# Patient Record
Sex: FEMALE | Race: WHITE | Employment: OTHER | URBAN - NONMETROPOLITAN AREA
[De-identification: names, ages, dates, MRNs, and addresses within clinical notes are randomized per-mention and may not be internally consistent; named-entity substitution may affect disease eponyms.]

---

## 2018-02-12 ENCOUNTER — APPOINTMENT (OUTPATIENT)
Dept: CT IMAGING | Age: 83
End: 2018-02-12
Payer: MEDICARE

## 2018-02-12 ENCOUNTER — HOSPITAL ENCOUNTER (EMERGENCY)
Age: 83
Discharge: HOME | End: 2018-02-12
Payer: MEDICARE

## 2018-02-12 ENCOUNTER — APPOINTMENT (OUTPATIENT)
Dept: GENERAL RADIOLOGY | Age: 83
End: 2018-02-12
Payer: MEDICARE

## 2018-02-12 VITALS
OXYGEN SATURATION: 96 % | RESPIRATION RATE: 16 BRPM | BODY MASS INDEX: 28.93 KG/M2 | HEART RATE: 70 BPM | WEIGHT: 180 LBS | SYSTOLIC BLOOD PRESSURE: 124 MMHG | DIASTOLIC BLOOD PRESSURE: 51 MMHG | TEMPERATURE: 98.7 F | HEIGHT: 66 IN

## 2018-02-12 DIAGNOSIS — N39.0 ACUTE UTI: Primary | ICD-10-CM

## 2018-02-12 LAB
ALBUMIN SERPL-MCNC: 3.9 G/DL (ref 3.5–5.1)
ALP BLD-CCNC: 62 U/L (ref 38–126)
ALT SERPL-CCNC: 24 U/L (ref 11–66)
ANION GAP SERPL CALCULATED.3IONS-SCNC: 13 MEQ/L (ref 8–16)
AST SERPL-CCNC: 24 U/L (ref 5–40)
BACTERIA: ABNORMAL /HPF
BASOPHILIA: SLIGHT
BASOPHILS # BLD: 0.7 %
BASOPHILS ABSOLUTE: 0.1 THOU/MM3 (ref 0–0.1)
BILIRUB SERPL-MCNC: 0.4 MG/DL (ref 0.3–1.2)
BILIRUBIN DIRECT: < 0.2 MG/DL (ref 0–0.3)
BILIRUBIN URINE: NEGATIVE
BLOOD, URINE: NEGATIVE
BUN BLDV-MCNC: 27 MG/DL (ref 7–22)
CALCIUM SERPL-MCNC: 9.3 MG/DL (ref 8.5–10.5)
CASTS 2: ABNORMAL /LPF
CASTS UA: ABNORMAL /LPF
CHARACTER, URINE: ABNORMAL
CHLORIDE BLD-SCNC: 103 MEQ/L (ref 98–111)
CO2: 27 MEQ/L (ref 23–33)
COLOR: YELLOW
CREAT SERPL-MCNC: 0.9 MG/DL (ref 0.4–1.2)
CRYSTALS, UA: ABNORMAL
EKG ATRIAL RATE: 74 BPM
EKG P AXIS: 44 DEGREES
EKG P-R INTERVAL: 124 MS
EKG Q-T INTERVAL: 432 MS
EKG QRS DURATION: 144 MS
EKG QTC CALCULATION (BAZETT): 479 MS
EKG R AXIS: -51 DEGREES
EKG T AXIS: 11 DEGREES
EKG VENTRICULAR RATE: 74 BPM
EOSINOPHIL # BLD: 1.6 %
EOSINOPHILS ABSOLUTE: 0.3 THOU/MM3 (ref 0–0.4)
EPITHELIAL CELLS, UA: ABNORMAL /HPF
GFR SERPL CREATININE-BSD FRML MDRD: 60 ML/MIN/1.73M2
GLUCOSE BLD-MCNC: 105 MG/DL (ref 70–108)
GLUCOSE URINE: NEGATIVE MG/DL
HCT VFR BLD CALC: 43.8 % (ref 37–47)
HEMOGLOBIN: 14.4 GM/DL (ref 12–16)
KETONES, URINE: NEGATIVE
LEUKOCYTE ESTERASE, URINE: ABNORMAL
LYMPHOCYTES # BLD: 15 %
LYMPHOCYTES ABSOLUTE: 2.4 THOU/MM3 (ref 1–4.8)
MAGNESIUM: 2.3 MG/DL (ref 1.6–2.4)
MCH RBC QN AUTO: 30 PG (ref 27–31)
MCHC RBC AUTO-ENTMCNC: 33 GM/DL (ref 33–37)
MCV RBC AUTO: 91.1 FL (ref 81–99)
MISCELLANEOUS 2: ABNORMAL
MONOCYTES # BLD: 8.4 %
MONOCYTES ABSOLUTE: 1.3 THOU/MM3 (ref 0.4–1.3)
NITRITE, URINE: POSITIVE
NUCLEATED RED BLOOD CELLS: 0 /100 WBC
OSMOLALITY CALCULATION: 290.5 MOSMOL/KG (ref 275–300)
PDW BLD-RTO: 14.4 % (ref 11.5–14.5)
PH UA: 7.5
PLATELET # BLD: 646 THOU/MM3 (ref 130–400)
PLATELET ESTIMATE: ABNORMAL
PMV BLD AUTO: 10.2 FL (ref 7.4–10.4)
POTASSIUM SERPL-SCNC: 4.3 MEQ/L (ref 3.5–5.2)
PROTEIN UA: NEGATIVE
RBC # BLD: 4.81 MILL/MM3 (ref 4.2–5.4)
RBC URINE: ABNORMAL /HPF
RENAL EPITHELIAL, UA: ABNORMAL
SCAN OF BLOOD SMEAR: NORMAL
SEG NEUTROPHILS: 74.3 %
SEGMENTED NEUTROPHILS ABSOLUTE COUNT: 11.8 THOU/MM3 (ref 1.8–7.7)
SODIUM BLD-SCNC: 143 MEQ/L (ref 135–145)
SPECIFIC GRAVITY, URINE: 1.02 (ref 1–1.03)
TOTAL PROTEIN: 6.7 G/DL (ref 6.1–8)
TROPONIN T: < 0.01 NG/ML
UROBILINOGEN, URINE: 0.2 EU/DL
WBC # BLD: 15.9 THOU/MM3 (ref 4.8–10.8)
WBC UA: ABNORMAL /HPF
YEAST: ABNORMAL

## 2018-02-12 PROCEDURE — 36415 COLL VENOUS BLD VENIPUNCTURE: CPT

## 2018-02-12 PROCEDURE — 6370000000 HC RX 637 (ALT 250 FOR IP): Performed by: PHYSICIAN ASSISTANT

## 2018-02-12 PROCEDURE — 71046 X-RAY EXAM CHEST 2 VIEWS: CPT

## 2018-02-12 PROCEDURE — 99284 EMERGENCY DEPT VISIT MOD MDM: CPT

## 2018-02-12 PROCEDURE — 85025 COMPLETE CBC W/AUTO DIFF WBC: CPT

## 2018-02-12 PROCEDURE — 87077 CULTURE AEROBIC IDENTIFY: CPT

## 2018-02-12 PROCEDURE — 87184 SC STD DISK METHOD PER PLATE: CPT

## 2018-02-12 PROCEDURE — 81001 URINALYSIS AUTO W/SCOPE: CPT

## 2018-02-12 PROCEDURE — 84484 ASSAY OF TROPONIN QUANT: CPT

## 2018-02-12 PROCEDURE — 83735 ASSAY OF MAGNESIUM: CPT

## 2018-02-12 PROCEDURE — 73030 X-RAY EXAM OF SHOULDER: CPT

## 2018-02-12 PROCEDURE — 87186 SC STD MICRODIL/AGAR DIL: CPT

## 2018-02-12 PROCEDURE — 87086 URINE CULTURE/COLONY COUNT: CPT

## 2018-02-12 PROCEDURE — 93005 ELECTROCARDIOGRAM TRACING: CPT | Performed by: PHYSICIAN ASSISTANT

## 2018-02-12 PROCEDURE — 80053 COMPREHEN METABOLIC PANEL: CPT

## 2018-02-12 PROCEDURE — 82248 BILIRUBIN DIRECT: CPT

## 2018-02-12 PROCEDURE — 70450 CT HEAD/BRAIN W/O DYE: CPT

## 2018-02-12 RX ORDER — NITROFURANTOIN 25; 75 MG/1; MG/1
100 CAPSULE ORAL 2 TIMES DAILY
Qty: 14 CAPSULE | Refills: 0 | Status: SHIPPED | OUTPATIENT
Start: 2018-02-12 | End: 2018-02-19

## 2018-02-12 RX ORDER — NITROFURANTOIN 25; 75 MG/1; MG/1
100 CAPSULE ORAL ONCE
Status: COMPLETED | OUTPATIENT
Start: 2018-02-12 | End: 2018-02-12

## 2018-02-12 RX ORDER — NITROFURANTOIN 25; 75 MG/1; MG/1
100 CAPSULE ORAL 2 TIMES DAILY
Qty: 14 CAPSULE | Refills: 0 | Status: SHIPPED | OUTPATIENT
Start: 2018-02-12 | End: 2018-02-12 | Stop reason: CLARIF

## 2018-02-12 RX ADMIN — NITROFURANTOIN MONOHYDRATE/MACROCRYSTALLINE 100 MG: 25; 75 CAPSULE ORAL at 21:41

## 2018-02-12 ASSESSMENT — ENCOUNTER SYMPTOMS
BACK PAIN: 0
CHOKING: 0
COUGH: 0
VOMITING: 0
PHOTOPHOBIA: 0
CHEST TIGHTNESS: 0
ABDOMINAL PAIN: 0
NAUSEA: 0
SINUS PAIN: 0
SHORTNESS OF BREATH: 0
WHEEZING: 0
DIARRHEA: 0
SORE THROAT: 0
SINUS PRESSURE: 0
RHINORRHEA: 0
EYE PAIN: 0
COLOR CHANGE: 0

## 2018-02-12 NOTE — ED PROVIDER NOTES
daily., Disp-1 Bottle, R-1      pantoprazole (PROTONIX) 40 MG tablet Take 1 tablet by mouth 2 times daily (before meals). , Disp-30 tablet, R-3      Multiple Vitamins-Minerals (THERAPEUTIC MULTIVITAMIN-MINERALS) tablet Take 1 tablet by mouth daily. ALLERGIES     has No Known Allergies. FAMILY HISTORY     indicated that her mother is . She indicated that her father is . family history includes Stroke in her father. SOCIAL HISTORY      reports that she has quit smoking. She quit after 2.00 years of use. She does not have any smokeless tobacco history on file. She reports that she does not drink alcohol or use drugs. PHYSICAL EXAM     INITIAL VITALS:  height is 5' 6\" (1.676 m) and weight is 180 lb (81.6 kg). Her oral temperature is 98.7 °F (37.1 °C). Her blood pressure is 124/51 (abnormal) and her pulse is 70. Her respiration is 16 and oxygen saturation is 96%. Physical Exam   Constitutional: She is oriented to person, place, and time. She appears well-developed and well-nourished. No distress. HENT:   Head: Normocephalic and atraumatic. Head is without raccoon's eyes, without Garcia's sign, without right periorbital erythema and without left periorbital erythema. Right Ear: External ear normal.   Left Ear: External ear normal.   Nose: Nose normal.   Mouth/Throat: Oropharynx is clear and moist.   Hearing aids noted   Eyes: Conjunctivae and EOM are normal. Pupils are equal, round, and reactive to light. Right eye exhibits no discharge. Left eye exhibits no discharge. Neck: Normal range of motion and full passive range of motion without pain. No spinous process tenderness and no muscular tenderness present. No neck rigidity. Normal range of motion present. Cardiovascular: Normal rate, regular rhythm, normal heart sounds and intact distal pulses. Pulmonary/Chest: Effort normal and breath sounds normal. No stridor. No respiratory distress. She has no wheezes.  She has no dictations but occasionally words are mis-transcribed.)    Tyler Pal, [address] [facility],[address] [place] [address]  02/16/18 [id number]

## 2018-02-13 PROCEDURE — 93010 ELECTROCARDIOGRAM REPORT: CPT | Performed by: INTERNAL MEDICINE

## 2018-02-13 NOTE — ED NOTES
Pt resting in bed with family at bedside, VS reassessed and stable. Cintia Villarreal PA in room to update POC and pt as well as family indicates understanding as well as no further needs, questions, or concerns. Will continue to monitor.       Sharon Aponte RN  02/12/18 2023

## 2018-02-14 LAB
ORGANISM: ABNORMAL
URINE CULTURE REFLEX: ABNORMAL

## 2018-04-23 ENCOUNTER — OFFICE VISIT (OUTPATIENT)
Dept: UROLOGY | Age: 83
End: 2018-04-23
Payer: MEDICARE

## 2018-04-23 VITALS
WEIGHT: 204 LBS | HEIGHT: 66 IN | SYSTOLIC BLOOD PRESSURE: 132 MMHG | BODY MASS INDEX: 32.78 KG/M2 | DIASTOLIC BLOOD PRESSURE: 74 MMHG

## 2018-04-23 DIAGNOSIS — R33.9 INCOMPLETE EMPTYING OF BLADDER: ICD-10-CM

## 2018-04-23 DIAGNOSIS — R31.9 URINARY TRACT INFECTION WITH HEMATURIA, SITE UNSPECIFIED: ICD-10-CM

## 2018-04-23 DIAGNOSIS — R31.29 MICROSCOPIC HEMATURIA: Primary | ICD-10-CM

## 2018-04-23 DIAGNOSIS — N39.0 URINARY TRACT INFECTION WITH HEMATURIA, SITE UNSPECIFIED: ICD-10-CM

## 2018-04-23 LAB
BILIRUBIN URINE: NEGATIVE
BLOOD URINE, POC: ABNORMAL
CHARACTER, URINE: CLEAR
COLOR, URINE: YELLOW
GLUCOSE URINE: NEGATIVE MG/DL
KETONES, URINE: NEGATIVE
LEUKOCYTE CLUMPS, URINE: ABNORMAL
NITRITE, URINE: POSITIVE
PH, URINE: 7
POST VOID RESIDUAL (PVR): 65 ML
PROTEIN, URINE: NEGATIVE MG/DL
SPECIFIC GRAVITY, URINE: 1.02 (ref 1–1.03)
UROBILINOGEN, URINE: 0.2 EU/DL

## 2018-04-23 PROCEDURE — 1036F TOBACCO NON-USER: CPT | Performed by: UROLOGY

## 2018-04-23 PROCEDURE — G8400 PT W/DXA NO RESULTS DOC: HCPCS | Performed by: UROLOGY

## 2018-04-23 PROCEDURE — 99204 OFFICE O/P NEW MOD 45 MIN: CPT | Performed by: UROLOGY

## 2018-04-23 PROCEDURE — 81003 URINALYSIS AUTO W/O SCOPE: CPT | Performed by: UROLOGY

## 2018-04-23 PROCEDURE — 51798 US URINE CAPACITY MEASURE: CPT | Performed by: UROLOGY

## 2018-04-23 PROCEDURE — 1123F ACP DISCUSS/DSCN MKR DOCD: CPT | Performed by: UROLOGY

## 2018-04-23 PROCEDURE — G8427 DOCREV CUR MEDS BY ELIG CLIN: HCPCS | Performed by: UROLOGY

## 2018-04-23 PROCEDURE — 4040F PNEUMOC VAC/ADMIN/RCVD: CPT | Performed by: UROLOGY

## 2018-04-23 PROCEDURE — 52000 CYSTOURETHROSCOPY: CPT | Performed by: UROLOGY

## 2018-04-23 PROCEDURE — G8417 CALC BMI ABV UP PARAM F/U: HCPCS | Performed by: UROLOGY

## 2018-04-23 PROCEDURE — G8599 NO ASA/ANTIPLAT THER USE RNG: HCPCS | Performed by: UROLOGY

## 2018-04-23 PROCEDURE — 1090F PRES/ABSN URINE INCON ASSESS: CPT | Performed by: UROLOGY

## 2018-04-23 RX ORDER — CHLORAL HYDRATE 500 MG
3000 CAPSULE ORAL DAILY
COMMUNITY

## 2018-04-23 ASSESSMENT — ENCOUNTER SYMPTOMS
CHEST TIGHTNESS: 0
SHORTNESS OF BREATH: 0
FACIAL SWELLING: 0
EYE PAIN: 0
EYE REDNESS: 0
COLOR CHANGE: 0
BACK PAIN: 0
NAUSEA: 0
ABDOMINAL PAIN: 0

## 2018-04-24 ENCOUNTER — TELEPHONE (OUTPATIENT)
Dept: UROLOGY | Age: 83
End: 2018-04-24

## 2018-04-24 RX ORDER — DOXYCYCLINE HYCLATE 100 MG
100 TABLET ORAL 2 TIMES DAILY
Qty: 14 TABLET | Refills: 0 | Status: SHIPPED | OUTPATIENT
Start: 2018-04-24 | End: 2018-05-01

## 2018-04-25 LAB
ORGANISM: ABNORMAL
URINE CULTURE, ROUTINE: ABNORMAL

## 2018-05-04 ENCOUNTER — HOSPITAL ENCOUNTER (OUTPATIENT)
Dept: MRI IMAGING | Age: 83
Discharge: HOME | End: 2018-05-04
Payer: MEDICARE

## 2018-05-04 DIAGNOSIS — R31.9 URINARY TRACT INFECTION WITH HEMATURIA, SITE UNSPECIFIED: ICD-10-CM

## 2018-05-04 DIAGNOSIS — N39.0 URINARY TRACT INFECTION WITH HEMATURIA, SITE UNSPECIFIED: ICD-10-CM

## 2018-05-04 DIAGNOSIS — R31.29 MICROSCOPIC HEMATURIA: ICD-10-CM

## 2018-05-04 DIAGNOSIS — R33.9 INCOMPLETE EMPTYING OF BLADDER: ICD-10-CM

## 2018-05-04 LAB — POC CREATININE WHOLE BLOOD: 0.9 MG/DL (ref 0.5–1.2)

## 2018-05-04 PROCEDURE — 72197 MRI PELVIS W/O & W/DYE: CPT

## 2018-05-04 PROCEDURE — 82565 ASSAY OF CREATININE: CPT

## 2018-05-04 PROCEDURE — 6360000004 HC RX CONTRAST MEDICATION: Performed by: UROLOGY

## 2018-05-04 PROCEDURE — A9579 GAD-BASE MR CONTRAST NOS,1ML: HCPCS | Performed by: UROLOGY

## 2018-05-04 RX ADMIN — GADOTERIDOL 20 ML: 279.3 INJECTION, SOLUTION INTRAVENOUS at 10:26

## 2018-05-17 ENCOUNTER — OFFICE VISIT (OUTPATIENT)
Dept: UROLOGY | Age: 83
End: 2018-05-17
Payer: MEDICARE

## 2018-05-17 VITALS
SYSTOLIC BLOOD PRESSURE: 138 MMHG | BODY MASS INDEX: 32.27 KG/M2 | HEIGHT: 66 IN | DIASTOLIC BLOOD PRESSURE: 66 MMHG | WEIGHT: 200.8 LBS

## 2018-05-17 DIAGNOSIS — R31.29 MICROSCOPIC HEMATURIA: Primary | ICD-10-CM

## 2018-05-17 LAB
BILIRUBIN URINE: NEGATIVE
BLOOD URINE, POC: NEGATIVE
CHARACTER, URINE: ABNORMAL
COLOR, URINE: YELLOW
GLUCOSE URINE: NEGATIVE MG/DL
KETONES, URINE: NEGATIVE
LEUKOCYTE CLUMPS, URINE: ABNORMAL
NITRITE, URINE: NEGATIVE
PH, URINE: 7
PROTEIN, URINE: NEGATIVE MG/DL
SPECIFIC GRAVITY, URINE: 1.02 (ref 1–1.03)
UROBILINOGEN, URINE: 0.2 EU/DL

## 2018-05-17 PROCEDURE — G8427 DOCREV CUR MEDS BY ELIG CLIN: HCPCS | Performed by: UROLOGY

## 2018-05-17 PROCEDURE — G8400 PT W/DXA NO RESULTS DOC: HCPCS | Performed by: UROLOGY

## 2018-05-17 PROCEDURE — G8417 CALC BMI ABV UP PARAM F/U: HCPCS | Performed by: UROLOGY

## 2018-05-17 PROCEDURE — 99214 OFFICE O/P EST MOD 30 MIN: CPT | Performed by: UROLOGY

## 2018-05-17 PROCEDURE — 81003 URINALYSIS AUTO W/O SCOPE: CPT | Performed by: UROLOGY

## 2018-05-17 PROCEDURE — 1090F PRES/ABSN URINE INCON ASSESS: CPT | Performed by: UROLOGY

## 2018-05-17 PROCEDURE — 1036F TOBACCO NON-USER: CPT | Performed by: UROLOGY

## 2018-05-17 PROCEDURE — 1101F PT FALLS ASSESS-DOCD LE1/YR: CPT | Performed by: UROLOGY

## 2018-05-17 PROCEDURE — 4040F PNEUMOC VAC/ADMIN/RCVD: CPT | Performed by: UROLOGY

## 2018-05-17 PROCEDURE — 1123F ACP DISCUSS/DSCN MKR DOCD: CPT | Performed by: UROLOGY

## 2018-05-17 PROCEDURE — G8599 NO ASA/ANTIPLAT THER USE RNG: HCPCS | Performed by: UROLOGY

## 2018-05-17 ASSESSMENT — ENCOUNTER SYMPTOMS
CHEST TIGHTNESS: 0
ABDOMINAL PAIN: 0
EYE PAIN: 0
EYE ITCHING: 1
COLOR CHANGE: 0
BACK PAIN: 0
NAUSEA: 0
SHORTNESS OF BREATH: 0
FACIAL SWELLING: 0

## 2018-05-17 NOTE — PROGRESS NOTES
Subjective:      Patient ID: Chantelle Can 80 y.o. female 7/5/1933    Chief Complaint   Patient presents with    Follow-up     MRI prior to r/o pelvic mass       Hematuria   This is a new problem. The current episode started more than 1 month ago. The problem is unchanged. She describes the hematuria as microscopic hematuria. The hematuria occurs throughout her entire urinary stream. She reports no clotting in her urine stream. Her pain is at a severity of 2/10. The pain is mild. She describes her urine color as clear. Pertinent negatives include no abdominal pain, chills, facial swelling, fever or nausea. (Recurrent UTIs)       Past Medical History:   Diagnosis Date    Blood circulation, collateral     lower legs    Cataract     Hypertension     Unspecified cerebral artery occlusion with cerebral infarction 5-2014     UTI (lower urinary tract infection)        Social History     Social History    Marital status:      Spouse name: Devendra Hollingsworth Number of children: 6    Years of education: N/A     Occupational History    Not on file. Social History Main Topics    Smoking status: Former Smoker     Years: 2.00    Smokeless tobacco: Never Used    Alcohol use No    Drug use: No    Sexual activity: Not on file     Other Topics Concern    Not on file     Social History Narrative    No narrative on file       Family History   Problem Relation Age of Onset    Stroke Father        Past Surgical History:   Procedure Laterality Date    BREAST BIOPSY      EYE SURGERY      bilateral cataracts    OTHER SURGICAL HISTORY  7/1/2015    ORIF LEFT PROXIMAL HUMERUS FRACTURE       No Known Allergies      Current Outpatient Prescriptions:     D-MANNOSE PO, Take by mouth, Disp: , Rfl:     Omega-3 Fatty Acids (FISH OIL) 1000 MG CAPS, Take 3,000 mg by mouth daily Every other day is twice daily, Disp: , Rfl:     aspirin 81 MG chewable tablet, Take 1 tablet by mouth daily. , Disp: 30 tablet, Rfl: 2   recent MRI. There is nothing abnormality found. She does not have any mass in her pelvis. She has had recurrent infections. She will use d-mannose. She will follow-up in 6 months with an NP. We will again check a urine cytology at that time. I have reviewed all notes sent along with this referral including notes from a recent visit to his primary care provider.   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

## 2018-11-12 DIAGNOSIS — R31.29 MICROSCOPIC HEMATURIA: Primary | ICD-10-CM

## 2018-11-15 ENCOUNTER — HOSPITAL ENCOUNTER (OUTPATIENT)
Age: 83
Discharge: HOME | End: 2018-11-15
Payer: MEDICARE

## 2018-11-15 DIAGNOSIS — R31.29 MICROSCOPIC HEMATURIA: ICD-10-CM

## 2018-11-15 PROCEDURE — 88112 CYTOPATH CELL ENHANCE TECH: CPT

## 2018-11-30 ENCOUNTER — OFFICE VISIT (OUTPATIENT)
Dept: UROLOGY | Age: 83
End: 2018-11-30
Payer: MEDICARE

## 2018-11-30 VITALS
WEIGHT: 203 LBS | SYSTOLIC BLOOD PRESSURE: 118 MMHG | BODY MASS INDEX: 32.62 KG/M2 | DIASTOLIC BLOOD PRESSURE: 68 MMHG | HEIGHT: 66 IN

## 2018-11-30 DIAGNOSIS — R31.29 MICROHEMATURIA: Primary | ICD-10-CM

## 2018-11-30 DIAGNOSIS — N39.0 RECURRENT UTI: ICD-10-CM

## 2018-11-30 LAB
BILIRUBIN URINE: NEGATIVE
BLOOD URINE, POC: NEGATIVE
CHARACTER, URINE: ABNORMAL
COLOR, URINE: YELLOW
GLUCOSE URINE: NEGATIVE MG/DL
KETONES, URINE: NEGATIVE
LEUKOCYTE CLUMPS, URINE: ABNORMAL
NITRITE, URINE: POSITIVE
PH, URINE: 7
PROTEIN, URINE: NEGATIVE MG/DL
SPECIFIC GRAVITY, URINE: 1.02 (ref 1–1.03)
UROBILINOGEN, URINE: 0.2 EU/DL

## 2018-11-30 PROCEDURE — G8484 FLU IMMUNIZE NO ADMIN: HCPCS | Performed by: NURSE PRACTITIONER

## 2018-11-30 PROCEDURE — 1101F PT FALLS ASSESS-DOCD LE1/YR: CPT | Performed by: NURSE PRACTITIONER

## 2018-11-30 PROCEDURE — 1090F PRES/ABSN URINE INCON ASSESS: CPT | Performed by: NURSE PRACTITIONER

## 2018-11-30 PROCEDURE — G8427 DOCREV CUR MEDS BY ELIG CLIN: HCPCS | Performed by: NURSE PRACTITIONER

## 2018-11-30 PROCEDURE — 1123F ACP DISCUSS/DSCN MKR DOCD: CPT | Performed by: NURSE PRACTITIONER

## 2018-11-30 PROCEDURE — 1036F TOBACCO NON-USER: CPT | Performed by: NURSE PRACTITIONER

## 2018-11-30 PROCEDURE — G8599 NO ASA/ANTIPLAT THER USE RNG: HCPCS | Performed by: NURSE PRACTITIONER

## 2018-11-30 PROCEDURE — 99213 OFFICE O/P EST LOW 20 MIN: CPT | Performed by: NURSE PRACTITIONER

## 2018-11-30 PROCEDURE — G8417 CALC BMI ABV UP PARAM F/U: HCPCS | Performed by: NURSE PRACTITIONER

## 2018-11-30 PROCEDURE — 81003 URINALYSIS AUTO W/O SCOPE: CPT | Performed by: NURSE PRACTITIONER

## 2018-11-30 PROCEDURE — G8400 PT W/DXA NO RESULTS DOC: HCPCS | Performed by: NURSE PRACTITIONER

## 2018-11-30 PROCEDURE — 4040F PNEUMOC VAC/ADMIN/RCVD: CPT | Performed by: NURSE PRACTITIONER

## 2018-11-30 RX ORDER — TURMERIC/TURMERIC EXT/PEPR EXT 900-100 MG
CAPSULE ORAL
COMMUNITY

## 2018-11-30 RX ORDER — GLUCOSAMINE/CHONDR SU A SOD 750-600 MG
TABLET ORAL
COMMUNITY

## 2018-11-30 RX ORDER — MULTIVITAMIN WITH IRON
250 TABLET ORAL DAILY
COMMUNITY

## 2018-11-30 NOTE — PROGRESS NOTES
[address] Hilaria Mendieta  Dept: [phone]  Dept Fax: [phone] : [phone]      Visit Date: 11/30/2018    HPI:     Heavenly Ray presents for follow-up of persistent microscopic hematuria and recurrent UTI. She underwent cystoscopy on 4/23/18 which was normal. CX bladder at that time was normal. Pelvic MRI on 5/4/18 was normal. She takes D-Mannose for UTI prophylaxis. She reports that she is doing well. She denies any gross hematuria. She has urinary frequency but that is her baseline. She denies any dysuria. She is feeling well. 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 Eyes: Denies reported visual changes. ENT: Denies headache, difficulty swallowing, nose bleeds, ringing in ears, or earaches. Cardiovascular: Negative for chest pain, palpitations, tachycardia or edema. Respiratory: Denies cough or SOB. GI:The patient denies abdominal or flank pain, anorexia, nausea or vomiting. : See HPI  Musculoskeletal: Patient denies low back pain or painful or reduced ROM of the back or extremities. Neurological: The patient denies any symptoms of neurological impairment or TIA's; no history of stroke.   Lymphatic: Denies swollen glands in neck, axillary or inguinal areas. Psychiatric: Denies anxiety or depression. Skin: Denies rash or lesions. The remainder of the complete ROS is negative    PHYSICAL EXAM:  VITALS:  There were no vitals taken for this visit. .    Constitutional:    Alert and oriented times 3, no acute distress and cooperative to examination with appropriate mood and affect. HEENT:   Head:         Normocephalic and atraumatic. Mouth/Throat:          Mucous membranes are normal.   Eyes:         EOM are normal. No scleral icterus. Nose:    The external appearance of the nose is normal  Ears: The ears appear normal to external inspection   Neck:         Supple, symmetrical, trachea midline, no adenopathy, thyroid symmetric, not enlarged and no tenderness. Cardiovascular:        Normal rate, regular rhythm, S1 S2 heart sounds. Pulmonary/Chest:       Chest symmetric with normal A/P diameter, no wheezes, rales, or rhonchi noted. Normal respiratory rate and rhthym. No use of accessory muscles. Abdominal:          Soft. No tenderness. Active bowel sounds. Musculoskeletal:    Normal range of motion. She exhibits no edema or tenderness of lower extremities. Extremities:    No cyanosis, clubbing, or edema present. Neurological:    Alert and oriented. No cranial nerve deficit. There are no focalizing motor or sensory deficits.     DATA:  CBC:   Lab Results   Component Value Date    WBC 15.9 02/12/2018    RBC 4.81 02/12/2018    HGB 14.4 02/12/2018    HCT 43.8 02/12/2018    MCV 91.1 02/12/2018    MCH 30.0 02/12/2018    MCHC 33.0 02/12/2018    RDW 14.4 02/12/2018     02/12/2018    MPV 10.2 02/12/2018     BMP:    Lab Results   Component Value Date     02/12/2018    K 4.3 02/12/2018     02/12/2018    CO2 27 02/12/2018    BUN 27 02/12/2018    CREATININE 0.9 02/12/2018    CALCIUM 9.3 02/12/2018    LABGLOM 60 02/12/2018    GLUCOSE 105 02/12/2018     BUN/Creatinine:    Lab Results   Component Value Date    BUN 27

## 2018-12-04 LAB
ORGANISM: ABNORMAL
URINE CULTURE, ROUTINE: ABNORMAL

## 2020-07-27 ENCOUNTER — HOSPITAL ENCOUNTER (OUTPATIENT)
Dept: MRI IMAGING | Age: 85
Discharge: HOME | End: 2020-07-27
Payer: MEDICARE

## 2020-07-27 PROCEDURE — 72146 MRI CHEST SPINE W/O DYE: CPT

## 2020-07-27 PROCEDURE — 72148 MRI LUMBAR SPINE W/O DYE: CPT

## 2020-08-15 ENCOUNTER — APPOINTMENT (OUTPATIENT)
Dept: GENERAL RADIOLOGY | Age: 85
DRG: 689 | End: 2020-08-15
Payer: MEDICARE

## 2020-08-15 ENCOUNTER — HOSPITAL ENCOUNTER (INPATIENT)
Age: 85
LOS: 1 days | Discharge: HOME HEALTH | DRG: 689 | End: 2020-08-18
Attending: EMERGENCY MEDICINE | Admitting: FAMILY MEDICINE
Payer: MEDICARE

## 2020-08-15 ENCOUNTER — APPOINTMENT (OUTPATIENT)
Dept: CT IMAGING | Age: 85
DRG: 689 | End: 2020-08-15
Payer: MEDICARE

## 2020-08-15 PROBLEM — R29.6 RECURRENT FALLS: Status: ACTIVE | Noted: 2020-08-15

## 2020-08-15 LAB
ALBUMIN SERPL-MCNC: 4.1 G/DL (ref 3.5–5.1)
ALP BLD-CCNC: 107 U/L (ref 38–126)
ALT SERPL-CCNC: 19 U/L (ref 11–66)
AMMONIA: 18 UMOL/L (ref 11–60)
ANION GAP SERPL CALCULATED.3IONS-SCNC: 11 MEQ/L (ref 8–16)
AST SERPL-CCNC: 49 U/L (ref 5–40)
BACTERIA: ABNORMAL /HPF
BASOPHILS # BLD: 0.7 %
BASOPHILS ABSOLUTE: 0.1 THOU/MM3 (ref 0–0.1)
BILIRUB SERPL-MCNC: 0.7 MG/DL (ref 0.3–1.2)
BILIRUBIN URINE: NEGATIVE
BLOOD, URINE: NEGATIVE
BUN BLDV-MCNC: 16 MG/DL (ref 7–22)
CALCIUM SERPL-MCNC: 9.2 MG/DL (ref 8.5–10.5)
CASTS 2: ABNORMAL /LPF
CASTS UA: ABNORMAL /LPF
CHARACTER, URINE: ABNORMAL
CHLORIDE BLD-SCNC: 104 MEQ/L (ref 98–111)
CO2: 25 MEQ/L (ref 23–33)
COLOR: ABNORMAL
CREAT SERPL-MCNC: 0.6 MG/DL (ref 0.4–1.2)
CRYSTALS, UA: ABNORMAL
EOSINOPHIL # BLD: 0.1 %
EOSINOPHILS ABSOLUTE: 0 THOU/MM3 (ref 0–0.4)
EPITHELIAL CELLS, UA: ABNORMAL /HPF
ERYTHROCYTE [DISTWIDTH] IN BLOOD BY AUTOMATED COUNT: 13.8 % (ref 11.5–14.5)
ERYTHROCYTE [DISTWIDTH] IN BLOOD BY AUTOMATED COUNT: 49.6 FL (ref 35–45)
GFR SERPL CREATININE-BSD FRML MDRD: > 90 ML/MIN/1.73M2
GLUCOSE BLD-MCNC: 116 MG/DL (ref 70–108)
GLUCOSE URINE: NEGATIVE MG/DL
HCT VFR BLD CALC: 49.9 % (ref 37–47)
HEMOGLOBIN: 15.7 GM/DL (ref 12–16)
IMMATURE GRANS (ABS): 0.11 THOU/MM3 (ref 0–0.07)
IMMATURE GRANULOCYTES: 0.7 %
INR BLD: 1.2 (ref 0.85–1.13)
KETONES, URINE: NEGATIVE
LEUKOCYTE ESTERASE, URINE: ABNORMAL
LYMPHOCYTES # BLD: 6.2 %
LYMPHOCYTES ABSOLUTE: 1 THOU/MM3 (ref 1–4.8)
MAGNESIUM: 2.2 MG/DL (ref 1.6–2.4)
MCH RBC QN AUTO: 30.4 PG (ref 26–33)
MCHC RBC AUTO-ENTMCNC: 31.5 GM/DL (ref 32.2–35.5)
MCV RBC AUTO: 96.7 FL (ref 81–99)
MISCELLANEOUS 2: ABNORMAL
MONOCYTES # BLD: 5.6 %
MONOCYTES ABSOLUTE: 0.9 THOU/MM3 (ref 0.4–1.3)
NITRITE, URINE: POSITIVE
NUCLEATED RED BLOOD CELLS: 0 /100 WBC
OSMOLALITY CALCULATION: 281.6 MOSMOL/KG (ref 275–300)
PH UA: 7.5 (ref 5–9)
PHOSPHORUS: 3.3 MG/DL (ref 2.4–4.7)
PLATELET # BLD: 554 THOU/MM3 (ref 130–400)
PMV BLD AUTO: 11.9 FL (ref 9.4–12.4)
POTASSIUM REFLEX MAGNESIUM: 4.3 MEQ/L (ref 3.5–5.2)
PRO-BNP: 1019 PG/ML (ref 0–1800)
PROTEIN UA: ABNORMAL
RBC # BLD: 5.16 MILL/MM3 (ref 4.2–5.4)
RBC URINE: ABNORMAL /HPF
REASON FOR REJECTION: NORMAL
REJECTED TEST: NORMAL
RENAL EPITHELIAL, UA: ABNORMAL
SEG NEUTROPHILS: 86.7 %
SEGMENTED NEUTROPHILS ABSOLUTE COUNT: 14.4 THOU/MM3 (ref 1.8–7.7)
SODIUM BLD-SCNC: 140 MEQ/L (ref 135–145)
SPECIFIC GRAVITY, URINE: 1.01 (ref 1–1.03)
TOTAL CK: 612 U/L (ref 30–135)
TOTAL PROTEIN: 7 G/DL (ref 6.1–8)
TROPONIN T: < 0.01 NG/ML
UROBILINOGEN, URINE: 0.2 EU/DL (ref 0–1)
WBC # BLD: 16.6 THOU/MM3 (ref 4.8–10.8)
WBC UA: ABNORMAL /HPF
YEAST: ABNORMAL

## 2020-08-15 PROCEDURE — 83880 ASSAY OF NATRIURETIC PEPTIDE: CPT

## 2020-08-15 PROCEDURE — 99285 EMERGENCY DEPT VISIT HI MDM: CPT

## 2020-08-15 PROCEDURE — 85025 COMPLETE CBC W/AUTO DIFF WBC: CPT

## 2020-08-15 PROCEDURE — 73552 X-RAY EXAM OF FEMUR 2/>: CPT

## 2020-08-15 PROCEDURE — 84100 ASSAY OF PHOSPHORUS: CPT

## 2020-08-15 PROCEDURE — G0378 HOSPITAL OBSERVATION PER HR: HCPCS

## 2020-08-15 PROCEDURE — 6360000002 HC RX W HCPCS: Performed by: EMERGENCY MEDICINE

## 2020-08-15 PROCEDURE — 82550 ASSAY OF CK (CPK): CPT

## 2020-08-15 PROCEDURE — 2580000003 HC RX 258: Performed by: FAMILY MEDICINE

## 2020-08-15 PROCEDURE — 96367 TX/PROPH/DG ADDL SEQ IV INF: CPT

## 2020-08-15 PROCEDURE — U0002 COVID-19 LAB TEST NON-CDC: HCPCS

## 2020-08-15 PROCEDURE — 85610 PROTHROMBIN TIME: CPT

## 2020-08-15 PROCEDURE — 81001 URINALYSIS AUTO W/SCOPE: CPT

## 2020-08-15 PROCEDURE — 99219 PR INITIAL OBSERVATION CARE/DAY 50 MINUTES: CPT | Performed by: FAMILY MEDICINE

## 2020-08-15 PROCEDURE — 84484 ASSAY OF TROPONIN QUANT: CPT

## 2020-08-15 PROCEDURE — 6370000000 HC RX 637 (ALT 250 FOR IP): Performed by: FAMILY MEDICINE

## 2020-08-15 PROCEDURE — 96366 THER/PROPH/DIAG IV INF ADDON: CPT

## 2020-08-15 PROCEDURE — 71045 X-RAY EXAM CHEST 1 VIEW: CPT

## 2020-08-15 PROCEDURE — 36415 COLL VENOUS BLD VENIPUNCTURE: CPT

## 2020-08-15 PROCEDURE — 70450 CT HEAD/BRAIN W/O DYE: CPT

## 2020-08-15 PROCEDURE — 82140 ASSAY OF AMMONIA: CPT

## 2020-08-15 PROCEDURE — 83735 ASSAY OF MAGNESIUM: CPT

## 2020-08-15 PROCEDURE — 73590 X-RAY EXAM OF LOWER LEG: CPT

## 2020-08-15 PROCEDURE — 80053 COMPREHEN METABOLIC PANEL: CPT

## 2020-08-15 PROCEDURE — 99284 EMERGENCY DEPT VISIT MOD MDM: CPT

## 2020-08-15 PROCEDURE — 6360000002 HC RX W HCPCS: Performed by: FAMILY MEDICINE

## 2020-08-15 PROCEDURE — 96365 THER/PROPH/DIAG IV INF INIT: CPT

## 2020-08-15 RX ORDER — SODIUM CHLORIDE 0.9 % (FLUSH) 0.9 %
10 SYRINGE (ML) INJECTION EVERY 12 HOURS SCHEDULED
Status: DISCONTINUED | OUTPATIENT
Start: 2020-08-15 | End: 2020-08-18 | Stop reason: HOSPADM

## 2020-08-15 RX ORDER — ACETAMINOPHEN 325 MG/1
650 TABLET ORAL EVERY 6 HOURS PRN
Status: DISCONTINUED | OUTPATIENT
Start: 2020-08-15 | End: 2020-08-18 | Stop reason: HOSPADM

## 2020-08-15 RX ORDER — ONDANSETRON 2 MG/ML
4 INJECTION INTRAMUSCULAR; INTRAVENOUS EVERY 6 HOURS PRN
Status: DISCONTINUED | OUTPATIENT
Start: 2020-08-15 | End: 2020-08-18 | Stop reason: HOSPADM

## 2020-08-15 RX ORDER — POTASSIUM CHLORIDE 7.45 MG/ML
10 INJECTION INTRAVENOUS PRN
Status: DISCONTINUED | OUTPATIENT
Start: 2020-08-15 | End: 2020-08-18 | Stop reason: HOSPADM

## 2020-08-15 RX ORDER — PNV NO.95/FERROUS FUM/FOLIC AC 28MG-0.8MG
250 TABLET ORAL DAILY
Status: DISCONTINUED | OUTPATIENT
Start: 2020-08-16 | End: 2020-08-16 | Stop reason: CLARIF

## 2020-08-15 RX ORDER — SODIUM CHLORIDE 0.9 % (FLUSH) 0.9 %
10 SYRINGE (ML) INJECTION PRN
Status: DISCONTINUED | OUTPATIENT
Start: 2020-08-15 | End: 2020-08-18 | Stop reason: HOSPADM

## 2020-08-15 RX ORDER — LEVOFLOXACIN 5 MG/ML
500 INJECTION, SOLUTION INTRAVENOUS ONCE
Status: COMPLETED | OUTPATIENT
Start: 2020-08-15 | End: 2020-08-15

## 2020-08-15 RX ORDER — SODIUM CHLORIDE 9 MG/ML
INJECTION, SOLUTION INTRAVENOUS CONTINUOUS
Status: DISCONTINUED | OUTPATIENT
Start: 2020-08-15 | End: 2020-08-18 | Stop reason: HOSPADM

## 2020-08-15 RX ORDER — PROMETHAZINE HYDROCHLORIDE 25 MG/1
12.5 TABLET ORAL EVERY 6 HOURS PRN
Status: DISCONTINUED | OUTPATIENT
Start: 2020-08-15 | End: 2020-08-18 | Stop reason: HOSPADM

## 2020-08-15 RX ORDER — ACETAMINOPHEN 650 MG/1
650 SUPPOSITORY RECTAL EVERY 6 HOURS PRN
Status: DISCONTINUED | OUTPATIENT
Start: 2020-08-15 | End: 2020-08-18 | Stop reason: HOSPADM

## 2020-08-15 RX ORDER — MAGNESIUM SULFATE IN WATER 40 MG/ML
2 INJECTION, SOLUTION INTRAVENOUS PRN
Status: DISCONTINUED | OUTPATIENT
Start: 2020-08-15 | End: 2020-08-18 | Stop reason: HOSPADM

## 2020-08-15 RX ORDER — POLYETHYLENE GLYCOL 3350 17 G/17G
17 POWDER, FOR SOLUTION ORAL DAILY PRN
Status: DISCONTINUED | OUTPATIENT
Start: 2020-08-15 | End: 2020-08-18 | Stop reason: HOSPADM

## 2020-08-15 RX ORDER — MULTIVITAMIN WITH IRON
250 TABLET ORAL DAILY
Status: DISCONTINUED | OUTPATIENT
Start: 2020-08-16 | End: 2020-08-15 | Stop reason: CLARIF

## 2020-08-15 RX ORDER — ASPIRIN 81 MG/1
81 TABLET, CHEWABLE ORAL DAILY
Status: DISCONTINUED | OUTPATIENT
Start: 2020-08-16 | End: 2020-08-18 | Stop reason: HOSPADM

## 2020-08-15 RX ORDER — AMLODIPINE BESYLATE 2.5 MG/1
2.5 TABLET ORAL 2 TIMES DAILY
Status: DISCONTINUED | OUTPATIENT
Start: 2020-08-15 | End: 2020-08-18 | Stop reason: HOSPADM

## 2020-08-15 RX ORDER — OMEGA-3-ACID ETHYL ESTERS 1 G/1
3000 CAPSULE, LIQUID FILLED ORAL DAILY
Status: DISCONTINUED | OUTPATIENT
Start: 2020-08-16 | End: 2020-08-18 | Stop reason: HOSPADM

## 2020-08-15 RX ADMIN — AMLODIPINE BESYLATE 2.5 MG: 2.5 TABLET ORAL at 22:45

## 2020-08-15 RX ADMIN — CEFTRIAXONE SODIUM 1 G: 1 INJECTION, POWDER, FOR SOLUTION INTRAMUSCULAR; INTRAVENOUS at 22:44

## 2020-08-15 RX ADMIN — LEVOFLOXACIN 500 MG: 5 INJECTION, SOLUTION INTRAVENOUS at 20:57

## 2020-08-15 RX ADMIN — SODIUM CHLORIDE: 9 INJECTION, SOLUTION INTRAVENOUS at 22:41

## 2020-08-15 RX ADMIN — SODIUM CHLORIDE, PRESERVATIVE FREE 10 ML: 5 INJECTION INTRAVENOUS at 22:42

## 2020-08-15 ASSESSMENT — PAIN SCALES - GENERAL
PAINLEVEL_OUTOF10: 0
PAINLEVEL_OUTOF10: 3

## 2020-08-15 ASSESSMENT — PAIN DESCRIPTION - LOCATION: LOCATION: LEG

## 2020-08-15 ASSESSMENT — PAIN DESCRIPTION - ORIENTATION: ORIENTATION: LEFT

## 2020-08-15 NOTE — ED PROVIDER NOTES
Mike [facility] 13 COMPLAINT       Chief Complaint   Patient presents with    Fall       Nurses Notes reviewed and I agree except as noted in the HPI. HISTORY OF PRESENT ILLNESS    Lady Ahumada is a 80 y.o. female. This patient arrives by squad. She is a very poor historian. She initially told me she did not fall today. The  said that she slipped in the bathtub and they could not get her up and he had to call the squad to bring her here. There is been multiple other falls this week. I was initially told 3 falls but the story changes every time I ask. She does not seem to be complaining of any neck or back pain although has a recent history of some compression fractures. She seems to be complaining of some pain in the left leg but she is very vague about it. There appears to be some underlying dementia    REVIEW OF SYSTEMS         Review of systems is not obtainable from this patient due to mental status       PAST MEDICAL HISTORY    has a past medical history of Blood circulation, collateral, Cataract, Hypertension, Unspecified cerebral artery occlusion with cerebral infarction, and UTI (lower urinary tract infection). SURGICAL HISTORY      has a past surgical history that includes eye surgery; Breast biopsy; and other surgical history (7/1/2015). CURRENT MEDICATIONS       Previous Medications    AMLODIPINE (NORVASC) 2.5 MG TABLET    Take 1 tablet by mouth 2 times daily. ASPIRIN 81 MG CHEWABLE TABLET    Take 1 tablet by mouth daily.     BACILLUS COAGULANS-INULIN PO    Take by mouth    BLACK PEPPER-TURMERIC (TURMERIC CURCUMIN) 5-1000 MG CAPS    Take by mouth    CHOLECALCIFEROL (VITAMIN D3) 5000 UNITS TABS    Take by mouth    CINNAMON 500 MG TABS    Take by mouth    CRANBERRY-VITAMIN C 00994-522 MG CAPS    Take by mouth    D-MANNOSE PO    Take by mouth    GINKGO BILOBA 120 MG TABS    Take by mouth    MAGNESIUM ([employer] MAGNESIUM) 250 MG TABS TABLET    Take 250 mg by mouth daily    MULTIPLE VITAMINS-MINERALS (THERAPEUTIC MULTIVITAMIN-MINERALS) TABLET    Take 1 tablet by mouth daily. NONFORMULARY        NONFORMULARY        NONFORMULARY        OMEGA-3 FATTY ACIDS (FISH OIL) 1000 MG CAPS    Take 3,000 mg by mouth daily Every other day is twice daily    SERTRALINE (ZOLOFT) 50 MG TABLET    Take 50 mg by mouth daily       ALLERGIES     has No Known Allergies. FAMILY HISTORY     She indicated that her mother is . She indicated that her father is . family history includes Stroke in her father. SOCIAL HISTORY      reports that she has quit smoking. She quit after 2.00 years of use. She has never used smokeless tobacco. She reports that she does not drink alcohol or use drugs. PHYSICAL EXAM     INITIAL VITALS:  oral temperature is 99.1 °F (37.3 °C). Her blood pressure is 141/94 (abnormal) and her pulse is 79. Her respiration is 19 and oxygen saturation is 95%. Constitutional:  chromically ill appearing   Eyes:  Pupils are equal and reactive, extraocular muscles intact   HENT: No crepitance or step-off on the face. No hematomas or lacerations noted on the head  oropharynx moist  Neck- normal range of motion, no midline tenderness, supple   Respiratory:  No wheezing, rhonchi or rales  Cardiovascular: regular  GI:  Non tender, no rigidity, rebound or guarding  Musculoskeletal: Range of motion both shoulders elbows wrists fingers, hip and knees bilaterally. Back- no tenderness  Integument: warm and dry  Neurologic:  Alert & oriented x 2, cranial nerves II through XII are grossly intact. DIAGNOSTIC RESULTS         RADIOLOGY: non-plain film images(s) such as CT, Ultrasound and MRI are read by the radiologist.  X-rays left femur, tib/fib with no obvious fracture reported interpreted by the radiologist.  CT of the head interpreted by the radiologist no acute process.   Chest x-ray atelectasis versus infiltrate. LABS:   Labs Reviewed   PROTIME-INR - Abnormal; Notable for the following components:       Result Value    INR 1.20 (*)     All other components within normal limits   CBC WITH AUTO DIFFERENTIAL - Abnormal; Notable for the following components:    WBC 16.6 (*)     Hematocrit 49.9 (*)     MCHC 31.5 (*)     RDW-SD 49.6 (*)     Platelets 145 (*)     Segs Absolute 14.4 (*)     Immature Grans (Abs) 0.11 (*)     All other components within normal limits   COMPREHENSIVE METABOLIC PANEL W/ REFLEX TO MG FOR LOW K - Abnormal; Notable for the following components:    Glucose 116 (*)     AST 49 (*)     All other components within normal limits   CK - Abnormal; Notable for the following components: Total  (*)     All other components within normal limits   URINE WITH REFLEXED MICRO - Abnormal; Notable for the following components:    Protein, UA TRACE (*)     Nitrite, Urine POSITIVE (*)     Leukocyte Esterase, Urine TRACE (*)     Color, UA DK YELLOW (*)     Character, Urine CLOUDY (*)     All other components within normal limits   BRAIN NATRIURETIC PEPTIDE   AMMONIA   TROPONIN   MAGNESIUM   PHOSPHORUS   ANION GAP   GLOMERULAR FILTRATION RATE, ESTIMATED   OSMOLALITY       EMERGENCY DEPARTMENT COURSE:   Vitals:    Vitals:    08/15/20 1646 08/15/20 1827 08/15/20 1849 08/15/20 2020   BP: (!) 139/95  (!) 149/90 (!) 141/94   Pulse: 78 84 82 79   Resp: 16 16 16 19   Temp: 99.1 °F (37.3 °C)      TempSrc: Oral      SpO2: 94% 94% 97% 95%     Patient is essentially too weak to get up. The had to call the squad to get her here. I cannot find any acute fracture or pain that is preventing her from getting up she just seems generally weak. She does have evidence of urinary tract infection. I ordered antibiotics. Case discussed with the hospitalist arrange for admission. CRITICAL CARE:   none         FINAL IMPRESSION      1. Ambulatory dysfunction    2.  Urinary tract infection without hematuria, site unspecified    3.  Altered mental status, unspecified altered mental status type          DISPOSITION/PLAN   Admitted    DISCHARGE MEDICATIONS:  New Prescriptions    No medications on file       (Please note that portions of this note were completed with a voice recognition program.  Efforts were made to edit the dictations but occasionally words are mis-transcribed.)    Laurel Jean, [address] [place] [address] [place],   08/15/20 6294

## 2020-08-15 NOTE — ED NOTES
ED nurse-to-nurse bedside report    Chief Complaint   Patient presents with   [facility] ([place], [place]) Altered Mental Status      LOC: alert and orientated to name and place  Vital signs   Vitals:    08/15/20 1646 08/15/20 1827 08/15/20 1849   BP: (!) 139/95  (!) 149/90   Pulse: 78 84 82   Resp: 16 16 16   Temp: 99.1 °F (37.3 °C)     TempSrc: Oral     SpO2: 94% 94% 97%      Pain:    Pain Interventions: none at this time. Pain Goal:   Oxygen: No    Current needs required none   Telemetry: Yes  LDAs:    Continuous Infusions:   Mobility: Requires assistance * 1  Rogel Fall Risk Score: No flowsheet data found.   Fall Interventions: call light within reach, bed rails up  Report given to: Milad [address] [place] [address], RN  08/15/20 9822

## 2020-08-15 NOTE — ED NOTES
Pt presents via ems with c/o frequent falls. The patient and  report 2 falls. One last night in the garden where she reports she lost her balance. She fell again today in bathroom and was unable to get up. Pt does report L leg pain. She denies any dizziness, CP, SOB, or loc. She denies hitting head on either fall.  denies mentation changes but does report some change in walking that he reports is related to a recent hip injury. This injury was evaluated with MRI and found to have bruising per his report. Pt is alert and oriented at this time.              Odalis Guerrero RN  08/15/20 9280

## 2020-08-16 ENCOUNTER — APPOINTMENT (OUTPATIENT)
Dept: GENERAL RADIOLOGY | Age: 85
DRG: 689 | End: 2020-08-16
Payer: MEDICARE

## 2020-08-16 LAB
ALBUMIN SERPL-MCNC: 3.7 G/DL (ref 3.5–5.1)
ALP BLD-CCNC: 94 U/L (ref 38–126)
ALT SERPL-CCNC: 20 U/L (ref 11–66)
ANION GAP SERPL CALCULATED.3IONS-SCNC: 11 MEQ/L (ref 8–16)
AST SERPL-CCNC: 60 U/L (ref 5–40)
BASOPHILS # BLD: 0.6 %
BASOPHILS ABSOLUTE: 0.1 THOU/MM3 (ref 0–0.1)
BILIRUB SERPL-MCNC: 0.8 MG/DL (ref 0.3–1.2)
BUN BLDV-MCNC: 12 MG/DL (ref 7–22)
CALCIUM SERPL-MCNC: 8.6 MG/DL (ref 8.5–10.5)
CHLORIDE BLD-SCNC: 105 MEQ/L (ref 98–111)
CO2: 25 MEQ/L (ref 23–33)
CREAT SERPL-MCNC: 0.6 MG/DL (ref 0.4–1.2)
EOSINOPHIL # BLD: 0.6 %
EOSINOPHILS ABSOLUTE: 0.1 THOU/MM3 (ref 0–0.4)
ERYTHROCYTE [DISTWIDTH] IN BLOOD BY AUTOMATED COUNT: 13.9 % (ref 11.5–14.5)
ERYTHROCYTE [DISTWIDTH] IN BLOOD BY AUTOMATED COUNT: 49.8 FL (ref 35–45)
GFR SERPL CREATININE-BSD FRML MDRD: > 90 ML/MIN/1.73M2
GLUCOSE BLD-MCNC: 111 MG/DL (ref 70–108)
HCT VFR BLD CALC: 46.7 % (ref 37–47)
HEMOGLOBIN: 14.7 GM/DL (ref 12–16)
IMMATURE GRANS (ABS): 0.14 THOU/MM3 (ref 0–0.07)
IMMATURE GRANULOCYTES: 0.9 %
LACTIC ACID: 1 MMOL/L (ref 0.5–2.2)
LYMPHOCYTES # BLD: 10.1 %
LYMPHOCYTES ABSOLUTE: 1.6 THOU/MM3 (ref 1–4.8)
MCH RBC QN AUTO: 30.8 PG (ref 26–33)
MCHC RBC AUTO-ENTMCNC: 31.5 GM/DL (ref 32.2–35.5)
MCV RBC AUTO: 97.7 FL (ref 81–99)
MONOCYTES # BLD: 9.2 %
MONOCYTES ABSOLUTE: 1.4 THOU/MM3 (ref 0.4–1.3)
NUCLEATED RED BLOOD CELLS: 0 /100 WBC
PLATELET # BLD: 481 THOU/MM3 (ref 130–400)
PMV BLD AUTO: 11.8 FL (ref 9.4–12.4)
POTASSIUM REFLEX MAGNESIUM: 3.7 MEQ/L (ref 3.5–5.2)
RBC # BLD: 4.78 MILL/MM3 (ref 4.2–5.4)
SARS-COV-2, NAAT: NOT DETECTED
SEG NEUTROPHILS: 78.6 %
SEGMENTED NEUTROPHILS ABSOLUTE COUNT: 12.2 THOU/MM3 (ref 1.8–7.7)
SODIUM BLD-SCNC: 141 MEQ/L (ref 135–145)
TOTAL CK: 671 U/L (ref 30–135)
TOTAL PROTEIN: 6.3 G/DL (ref 6.1–8)
WBC # BLD: 15.5 THOU/MM3 (ref 4.8–10.8)

## 2020-08-16 PROCEDURE — 6370000000 HC RX 637 (ALT 250 FOR IP): Performed by: FAMILY MEDICINE

## 2020-08-16 PROCEDURE — 96366 THER/PROPH/DIAG IV INF ADDON: CPT

## 2020-08-16 PROCEDURE — 83605 ASSAY OF LACTIC ACID: CPT

## 2020-08-16 PROCEDURE — 97535 SELF CARE MNGMENT TRAINING: CPT

## 2020-08-16 PROCEDURE — 85025 COMPLETE CBC W/AUTO DIFF WBC: CPT

## 2020-08-16 PROCEDURE — G0378 HOSPITAL OBSERVATION PER HR: HCPCS

## 2020-08-16 PROCEDURE — 87077 CULTURE AEROBIC IDENTIFY: CPT

## 2020-08-16 PROCEDURE — 97166 OT EVAL MOD COMPLEX 45 MIN: CPT

## 2020-08-16 PROCEDURE — 96372 THER/PROPH/DIAG INJ SC/IM: CPT

## 2020-08-16 PROCEDURE — 2580000003 HC RX 258: Performed by: FAMILY MEDICINE

## 2020-08-16 PROCEDURE — 87086 URINE CULTURE/COLONY COUNT: CPT

## 2020-08-16 PROCEDURE — 71046 X-RAY EXAM CHEST 2 VIEWS: CPT

## 2020-08-16 PROCEDURE — 80053 COMPREHEN METABOLIC PANEL: CPT

## 2020-08-16 PROCEDURE — 99232 SBSQ HOSP IP/OBS MODERATE 35: CPT | Performed by: PHYSICIAN ASSISTANT

## 2020-08-16 PROCEDURE — 82550 ASSAY OF CK (CPK): CPT

## 2020-08-16 PROCEDURE — 36415 COLL VENOUS BLD VENIPUNCTURE: CPT

## 2020-08-16 PROCEDURE — 6360000002 HC RX W HCPCS: Performed by: FAMILY MEDICINE

## 2020-08-16 PROCEDURE — 97530 THERAPEUTIC ACTIVITIES: CPT

## 2020-08-16 RX ORDER — MULTIVITAMIN/IRON/FOLIC ACID 18MG-0.4MG
250 TABLET ORAL DAILY
Status: DISCONTINUED | OUTPATIENT
Start: 2020-08-16 | End: 2020-08-18 | Stop reason: HOSPADM

## 2020-08-16 RX ADMIN — SERTRALINE HYDROCHLORIDE 50 MG: 50 TABLET, FILM COATED ORAL at 08:27

## 2020-08-16 RX ADMIN — SODIUM CHLORIDE: 9 INJECTION, SOLUTION INTRAVENOUS at 12:45

## 2020-08-16 RX ADMIN — ASPIRIN 81 MG: 81 TABLET, CHEWABLE ORAL at 08:27

## 2020-08-16 RX ADMIN — AMLODIPINE BESYLATE 2.5 MG: 2.5 TABLET ORAL at 21:36

## 2020-08-16 RX ADMIN — CEFTRIAXONE SODIUM 1 G: 1 INJECTION, POWDER, FOR SOLUTION INTRAMUSCULAR; INTRAVENOUS at 21:36

## 2020-08-16 RX ADMIN — OMEGA-3-ACID ETHYL ESTERS 3000 MG: 1 CAPSULE, LIQUID FILLED ORAL at 08:27

## 2020-08-16 RX ADMIN — ENOXAPARIN SODIUM 40 MG: 40 INJECTION SUBCUTANEOUS at 08:27

## 2020-08-16 RX ADMIN — AMLODIPINE BESYLATE 2.5 MG: 2.5 TABLET ORAL at 08:27

## 2020-08-16 RX ADMIN — Medication 250 MG: at 08:27

## 2020-08-16 ASSESSMENT — PAIN SCALES - GENERAL
PAINLEVEL_OUTOF10: 0

## 2020-08-16 NOTE — PROGRESS NOTES
Jesus Daniel 60  INPATIENT OCCUPATIONAL THERAPY  [facility] SUR[facility] 8B  EVALUATION    Time:   Time In: 3526  Time Out: 1229  Timed Code Treatment Minutes: 26 Minutes  Minutes: 36          Date: 2020  Patient Name: Sravani Taylor,   Gender: female      MRN: [id number]  : 1933  (80 y.o.)  Referring Practitioner: Verner Platt, MD  Diagnosis: Recurrent falls  Additional Pertinent Hx: Pt presents to ED s/p 2 falls at home, one outside and one in bathtub,  also reports increased confusion over past few days    Restrictions/Precautions:  Restrictions/Precautions: General Precautions, Fall Risk    Subjective  Chart Reviewed: Yes, Orders, Progress Notes, History and Physical, Imaging, Labs, Previous Admission  Patient assessed for rehabilitation services?: Yes  Family / Caregiver Present: Yes()    Subjective: very pleasant, cooperative,  present throughout  Comments: RN ok'd OT    Pain:  Pain Assessment  Patient Currently in Pain: Denies    Social/Functional History:  Lives With: Spouse  Type of Home: House  Home Layout: Laundry in basement, Work area in basement(spends quite a bit of time in basement)  Home Access: Stairs to enter with rails  Entrance Stairs - Number of Steps: 1+1  Entrance Stairs - Rails: Left  Home Equipment: Rolling walker, Standard walker   Bathroom Shower/Tub: Tub/Shower unit  Bathroom Toilet: Handicap height  Bathroom Equipment: Shower chair(wide shower chair)    Receives Help From: Family  ADL Assistance: Independent  Homemaking Assistance: Independent(shared cooking and cleaning tasks, pt does laundry and  mows lawn)  Ambulation Assistance: Independent(no AD)  Transfer Assistance: Independent    Active : Yes     Additional Comments: Pt and spouse reportedly spend a great deal of time in basement living space, spouse suggesting install of second hand rail for BUE use with stairs.   Pt was independent with ADL tasks and ambulated without AD however reportedly demonstated increased furniture walking. pt's daughter who is an occupational therapist is moving home from [place] in Sept    VISION:WFL    HEARING:  WFL    COGNITION: Decreased Safety Awareness    RANGE OF MOTION:  Bilateral Upper Extremity:  WFL    ADL:   Grooming: Contact Guard Assistance. standing sinkside, cues for RW safety, abandons RW  Lower Extremity Dressing: Maximum Assistance. donning socks  Toileting: Contact Guard Assistance, with verbal cues  and with increased time for completion. VCs for safety  Toilet Transfer: Minimal Assistance and with verbal cues . Max VCs for safe hand placement, STS. BALANCE:  Sitting Balance:  Supervision. Standing Balance: Contact Guard Assistance. BED MOBILITY:  Supine to Sit: Moderate Assistance at trunk, inc time, HOB elevated  Sit to Supine: Minimal Assistance, with verbal cues , with increased time for completion BLE  Scooting: Contact Guard Assistance      TRANSFERS:  Sit to Stand:  Minimal Assistance. frequent, repeated cues for safe hand placement  Stand to Sit: Minimal Assistance. inc time with frequent repeated cues for safety    FUNCTIONAL MOBILITY:  Assistive Device: Rolling Walker  Assist Level:  Contact Guard Assistance and Minimal Assistance. Distance: To and from bathroom  CGA primarily however needed occasional physical assistance to safely guide and use RW for mobility, especially around environmental barriers     ADDITIONAL ACTIVITIES:   Lengthy time spent with pt and spouse provided education on home safety, applicable DME, discharge planning and proper RW use. Pt and spouse verbalized good understandign to education. Activity Tolerance:  Patient tolerance of  treatment: good.         Assessment:  Assessment: Pt would continue to benefit from skilled OT intervention to maximize pt safety and independence with performing self care tasks and functional mobility following multiple falls at home and to ensure safe transition to the next level of care and return to OF. Performance deficits / Impairments: Decreased functional mobility , Decreased ADL status, Decreased endurance, Decreased safe awareness, Decreased balance, Decreased high-level IADLs  Prognosis: Good  REQUIRES OT FOLLOW UP: Yes    Treatment Initiated: Treatment and education initiated within context of evaluation. Evaluation time included review of current medical information, gathering information related to past medical, social and functional history, completion of standardized testing, formal and informal observation of tasks, assessment of data and development of plan of care and goals. Treatment time included skilled education and facilitation of tasks to increase safety and independence with ADL's for improved functional independence and quality of life. Discharge Recommendations:  Home with Home health OT    Patient Education:  OT Education: OT Role, Equipment, Family Education, ADL Adaptive Strategies, Plan of Care, Transfer Training  Patient Education: home safety, applicable DME    Equipment Recommendations:  Equipment Needed: Yes  Other: recommend RW and BSC at this time    Plan:  Times per week: 5x  Current Treatment Recommendations: Strengthening, Endurance Training, Balance Training, Functional Mobility Training, Equipment Evaluation, Education, & procurement, Self-Care / ADL, Patient/Caregiver Education & Training, Home Management Training, Safety Education & Training. See long-term goal time frame for expected duration of plan of care. If no long-term goals established, a short length of stay is anticipated.     Goals:  Patient goals : to go home  Short term goals  Time Frame for Short term goals: By discharge  Short term goal 1: Pt will safely navigate to/from bathroom and around environemental barriers with no > SBA and 0 cues for RW safety for inc indep accessing environment  Short term goal 2: Pt will demonstrate standing endurance >6 mins with 1-2 UE release at SBA in prep for simple IADLs  Short term goal 3: Pt will complete various functional t/fs with SBA and 0 cues for safety for inc indep with toileting         Following session, patient left in safe position with all fall risk precautions in place.

## 2020-08-16 NOTE — PLAN OF CARE
Problem: Falls - Risk of:  Goal: Will remain free from falls  Description: Will remain free from falls  Outcome: Ongoing  Note: Patient absent of falls this shift, fall band intact, bed alarm set, falling star magnet in place. Goal: Absence of physical injury  Description: Absence of physical injury  Outcome: Ongoing  Note: No physical injury this shift. Educated patient on the safety, use of call light and calling out appropriately. Care plan reviewed with patient. Patient verbalizes understanding of the plan of care and contributes to goal setting.

## 2020-08-16 NOTE — H&P
History & Physical        Patient:  Radha Bedolla  YOB: 1933    MRN: [id number]     Acct: [de-identified]    PCP: Kristi Sol MD    Date of Admission: 8/15/2020    Date of Service: Pt seen/examined on 08/15/20  and Admitted to Observation with expected LOS less than two midnights due to medical therapy. Chief Complaint:    Fall     A/P:    1. Encephalopathy/Recurrent Falls likely 2/2 UTI  - Leukocytosis, no fever, +ve dysuria  - spoke with  --> pt more confused for last 1-2 days. - usually oriented to person place and time  - UCx pending.  - IVF gently overnight.   - levaquin given in ER. Will order rocephin IV for now. - atelectasis vs infiltrate on 1 view cxr --> covid pending, will get 2 view in AM. (no cough, or fever, or hypoxia. Less likely a pna)     2. HTN  - continue home meds  - monitor per protocol    3. CVA  - stable    4. Recurrent Falls/Rhabdomyolysis  - elevated CK. Will recheck in AM.   - PT/OT  - pt had atleast 2 falls recently  - possibly UTI related. - xrays negative. 5. Thrombocytosis  - elevated platelet count for quiet sometime dating back years    History Of Present Illness:      80 y.o. female with pmhx HTN, UTI, CVA, who presented to [address] [place] [address] with c/o fall x2.  states she has had more confusion for the last 1-2 days with atleast 2 falls. She fell last night, and  later found her out in the yard laying in the flower bed. He was unable to get her up, so he called her son. Then the next morning, she fell in the bathtub and  was unable to pick em up. She called EMS and they brought her to the ER. No LOC. Pt doesn't remember this at all. She has some underlying dementia possibly. Pt was found to have a UTI as well. She does have a history of UTIs. Pt is currently comfortable.  She tells me she does have dysuria  No fever, chills, n/v.    Past Medical History:          Diagnosis Date    Blood circulation, collateral     lower legs    Cataract     Hypertension     Unspecified cerebral artery occlusion with cerebral infarction 5-2014     UTI (lower urinary tract infection)        Past Surgical History:          Procedure Laterality Date    BREAST BIOPSY      EYE SURGERY      bilateral cataracts    OTHER SURGICAL HISTORY  7/1/2015    ORIF LEFT PROXIMAL HUMERUS FRACTURE       Medications Prior to Admission:      Prior to Admission medications    Medication Sig Start Date End Date Taking? Authorizing Provider   sertraline (ZOLOFT) 50 MG tablet Take 50 mg by mouth daily    Historical Provider, MD   Cinnamon 500 MG TABS Take by mouth    Historical Provider, MD   BACILLUS COAGULANS-INULIN PO Take by mouth    Historical Provider, MD   Ginkgo Biloba 120 MG TABS Take by mouth    Historical Provider, MD   Cranberry-Vitamin C 61036-446 MG CAPS Take by mouth    Historical Provider, MD   magnesium ([employer] MAGNESIUM) 250 MG TABS tablet Take 250 mg by mouth daily    Historical Provider, MD   Cholecalciferol (VITAMIN D3) 5000 units TABS Take by mouth    Historical Provider, MD   NONFORMULARY     Historical Provider, MD   NONFORMULARY     Historical Provider, MD   Black Pepper-Turmeric (TURMERIC CURCUMIN) 5-1000 MG CAPS Take by mouth    Historical Provider, MD   NONFORMULARY     Historical Provider, MD   D-MANNOSE PO Take by mouth    Historical Provider, MD   Omega-3 Fatty Acids (FISH OIL) 1000 MG CAPS Take 3,000 mg by mouth daily Every other day is twice daily    Historical Provider, MD   aspirin 81 MG chewable tablet Take 1 tablet by mouth daily. 5/14/14   Carola Nagy MD   amLODIPine (NORVASC) 2.5 MG tablet Take 1 tablet by mouth 2 times daily. 5/14/14   Carola Nagy MD   Multiple Vitamins-Minerals (THERAPEUTIC MULTIVITAMIN-MINERALS) tablet Take 1 tablet by mouth daily. Historical Provider, MD       Allergies:  Patient has no known allergies.     Social History:      The patient currently lives home with     TOBACCO:   reports that she has quit smoking. She quit after 2.00 years of use. She has never used smokeless tobacco.  ETOH:   reports no history of alcohol use. Family History:      Reviewed in detail and negative for DM, CAD, Cancer, CVA. Positive as follows:        Problem Relation Age of Onset    Stroke Father        Diet:  DIET CARDIAC;    REVIEW OF SYSTEMS:   Pertinent positives as noted in the HPI. All other systems reviewed and negative. PHYSICAL EXAM:    BP (!) 168/78   Pulse 75   Temp 98.3 °F (36.8 °C) (Oral)   Resp 20   SpO2 94%     General appearance: mild distress, appears stated age and cooperative. HEENT:  Normal cephalic, atraumatic without obvious deformity. Pupils equal, round, and reactive to light. Extra ocular muscles intact. Conjunctivae/corneas clear. Neck: Supple, with full range of motion. No jugular venous distention. Trachea midline. Respiratory:  Normal respiratory effort. Clear   Cardiovascular:  Regular rate and rhythm   Abdomen: Soft, non-tender, non-distended   Musculoskeletal:  No clubbing, cyanosis or edema bilaterally. Full range of motion without deformity. Skin: Skin color, texture, turgor normal.  No rashes or lesions. Neurologic:  grossly non-focal.  Psychiatric: alert but not oriented.  (new per )  Peripheral Pulses: +2 palpable, equal bilaterally       Labs:     Recent Labs     08/15/20  1750   WBC 16.6*   HGB 15.7   HCT 49.9*   *     Recent Labs     08/15/20  1750      K 4.3      CO2 25   BUN 16   CREATININE 0.6   CALCIUM 9.2   PHOS 3.3     Recent Labs     08/15/20  1750   AST 49*   ALT 19   BILITOT 0.7   ALKPHOS 107     Recent Labs     08/15/20  1750   INR 1.20*     Recent Labs     08/15/20  1750   CKTOTAL 612*       Urinalysis:      Lab Results   Component Value Date    NITRU POSITIVE 08/15/2020    WBCUA 5-9 08/15/2020    BACTERIA MANY 08/15/2020    RBCUA 0-2 08/15/2020    BLOODU NEGATIVE 08/15/2020    GLUCOSEU NEGATIVE 08/15/2020       Intake & Output:  No intake/output data recorded. No intake/output data recorded. Radiology:     CT HEAD WO CONTRAST   Final Result       1. No acute intracranial hemorrhage, mass effect or midline shift. 2. Chronic senescent changes including generalized atrophy and microvascular ischemic disease in the white matter. **This report has been created using voice recognition software. It may contain minor errors which are inherent in voice recognition technology. **      Final report electronically signed by Dr Diane Cerrato on 8/15/2020 7:06 PM      XR CHEST PORTABLE   Final Result   Left basilar opacities which may represent infiltrates or atelectasis. **This report has been created using voice recognition software. It may contain minor errors which are inherent in voice recognition technology. **      Final report electronically signed by Dr Diane Cerrato on 8/15/2020 6:54 PM      XR TIBIA FIBULA LEFT (2 VIEWS)   Final Result   Some soft tissue swelling. No acute fractures. **This report has been created using voice recognition software. It may contain minor errors which are inherent in voice recognition technology. **      Final report electronically signed by Dr Diane Cerrato on 8/15/2020 7:12 PM      XR FEMUR LEFT (MIN 2 VIEWS)   Final Result   Degenerative changes with no acute fracture. **This report has been created using voice recognition software. It may contain minor errors which are inherent in voice recognition technology. **      Final report electronically signed by Dr Diane Cerrato on 8/15/2020 7:11 PM           DVT prophylaxis: scd    Code Status: Full Code    Disposition:Home vs [facility]    Active Hospital Problems    Diagnosis Date Noted    Recurrent falls [R29.6] 08/15/2020     Thank you Fariba Webb MD for the opportunity to be involved in this patient's care.     Electronically signed by Zeeshan Matias MD on 8/15/2020 at 9:24 PM

## 2020-08-16 NOTE — ED NOTES
ED to inpatient nurses report    Chief Complaint   Patient presents with    Fall      Present to ED from home  LOC: alert and orientated to name and place  Vital signs   Vitals:    08/15/20 1646 08/15/20 1827 08/15/20 1849 08/15/20 2020   BP: (!) 139/95  (!) 149/90 (!) 141/94   Pulse: 78 84 82 79   Resp: 16 16 16 19   Temp: 99.1 °F (37.3 °C)      TempSrc: Oral      SpO2: 94% 94% 97% 95%      Oxygen Baseline room air     Current needs required antibiotics infusing Bipap/Cpap No  LDAs:   Peripheral IV 08/15/20 Left Antecubital (Active)     Mobility: Requires assistance * 2  Pending ED orders: none  Present condition: stable     Electronically signed by Rachael Grant RN on 8/15/2020 at 9:01 PM       Rachael Grant RN  08/15/20 6888

## 2020-08-16 NOTE — ED NOTES
Pt resting in bed with family at bedside. Vital signs stable, will continue to assess.        [facility]  08/15/20 2022

## 2020-08-16 NOTE — PROGRESS NOTES
Hospitalist Progress Note    Patient:  Purvi Cleaning    Unit/Bed:8B-26/026-A  YOB: 1933  MRN: [id number]   Acct: [de-identified]   PCP: Pablo Frazier MD  Code Status: Full Code  Date of Admission: 8/15/2020    Expected Discharge: 8/17? Disposition: Home? Assessment/Plan:    1. Acute metabolic encephalopathy: d/t #2, see below. Per , pt has been more confused for last couple days. Usually oriented to place and time. Pt is AOx3 at this time. 2. Acute cystitis: UA grossly infected; +dysuria, confusion. Urine was not sent for culture prior to starting abx, will order. Continue ceftriaxone (start date 8/15). 3. Essential HTN: resume home meds, monitor. 4. Hx CVA: 2014; stable. 5. Mechanical Falls: Imaging negative. PT/OT. 6. Thrombocytosis, chronic: this is chronic, dating back years. Chief Complaint: fall    HPI / Hospital Course: \"80 y.o. female with pmhx HTN, UTI, CVA, who presented to [facility] with c/o fall x2.  states she has had more confusion for the last 1-2 days with atleast 2 falls. She fell last night, and  later found her out in the yard laying in the flower bed. He was unable to get her up, so he called her son. Then the next morning, she fell in the bathtub and  was unable to pick em up. She called EMS and they brought her to the ER. No LOC. Pt doesn't remember this at all. She has some underlying dementia possibly. Pt was found to have a UTI as well. She does have a history of UTIs. Pt is currently comfortable. She tells me she does have dysuria  No fever, chills, n/v.\"     Subjective (past 24 hours): No acute events overnight. Pt is AOx3,  present at bedside. She denies fever/chills, SOB, CP, abd pain. Reports urinary frequency. ROS: Pertinent positives as noted in HPI. All other systems reviewed and negative.       Past medical history, family history, social history and allergies reviewed again and is unchanged since admission. Medications:  Reviewed. Infusion Medications    sodium chloride 75 mL/hr at 08/15/20 7571     Scheduled Medications    Magnesium Oxide  250 mg Oral Daily    amLODIPine  2.5 mg Oral BID    aspirin  81 mg Oral Daily    omega-3 acid ethyl esters  3,000 mg Oral Daily    sertraline  50 mg Oral Daily    sodium chloride flush  10 mL Intravenous 2 times per day    enoxaparin  40 mg Subcutaneous Daily    cefTRIAXone (ROCEPHIN) IV  1 g Intravenous Q24H     PRN Meds: sodium chloride flush, acetaminophen **OR** acetaminophen, polyethylene glycol, promethazine **OR** ondansetron, potassium chloride, magnesium sulfate    I/O:   No intake or output data in the 24 hours ending 08/16/20 9537    Diet:  DIET CARDIAC; Exam:  BP (!) 157/70   Pulse 65   Temp 98.3 °F (36.8 °C) (Oral)   Resp 16   SpO2 95%   General:   Pleasant elderly female. NAD. HEENT:  normocephalic and atraumatic. No scleral icterus. PERR. Neck: supple. No JVD. No thyromegaly. Lungs: clear to auscultation. No retractions  Cardiac: RRR without murmur. Abdomen: soft. Nontender. Bowel sounds positive. Extremities:  No clubbing, cyanosis, or edema x 4. Vasculature: capillary refill < 3 seconds. Palpable LE pulses bilaterally. Skin:  warm and dry. Psych:  Alert and oriented x3. Affect appropriate  Lymph:  No supraclavicular adenopathy. Neurologic:  No focal deficit. No seizures.       Data: (All radiographs, tracings, PFTs, and imaging are personally viewed and interpreted unless otherwise noted)  Labs:   Recent Labs     08/15/20  1750 08/16/20  0736   WBC 16.6* 15.5*   HGB 15.7 14.7   HCT 49.9* 46.7   * 481*     Recent Labs     08/15/20  1750 08/16/20  0736    141   K 4.3 3.7    105   CO2 25 25   BUN 16 12   CREATININE 0.6 0.6   CALCIUM 9.2 8.6   PHOS 3.3  --      Recent Labs     08/15/20  1750 08/16/20  0736   AST 49* 60*   ALT 19 20   BILITOT 0.7 0.8   ALKPHOS 107 94 Recent Labs     08/15/20  1750   INR 1.20*     Recent Labs     08/15/20  1750 08/16/20  0736   CKTOTAL 612* 671*     Urinalysis:   Lab Results   Component Value Date    NITRU POSITIVE 08/15/2020    WBCUA 5-9 08/15/2020    BACTERIA MANY 08/15/2020    RBCUA 0-2 08/15/2020    BLOODU NEGATIVE 08/15/2020    GLUCOSEU NEGATIVE 08/15/2020     Urine culture:   Lab Results   Component Value Date    LABURIN [place] count: >100,000 CFU/mL 11/30/2018     Micro:   Blood culture #1:   Lab Results   Component Value Date    BC No growth-preliminary  No growth   07/01/2015     Blood culture #2:No results found for: BLOODCULT2  Organism:  Lab Results   Component Value Date    ORG Escherichia coli 11/30/2018       No results found for: LABGRAM  MRSA culture only:No results found for: [facility]  Respiratory culture: No results found for: CULTRESP  Aerobic and Anaerobic :  No results found for: LABAERO  No results found for: [address]    Radiology Reports:  XR CHEST (2 VW)   Final Result   1. There is stable infiltrates throughout both lung fields with a predominant perihilar distribution. There is no pleural effusion. Follow-up chest radiographs are recommended to confirm complete resolution. 2. Additional findings as described in the body the report. **This report has been created using voice recognition software. It may contain minor errors which are inherent in voice recognition technology. **      Final report electronically signed by Dr. Christiana Meyers on 8/16/2020 7:34 AM      CT HEAD WO CONTRAST   Final Result       1. No acute intracranial hemorrhage, mass effect or midline shift. 2. Chronic senescent changes including generalized atrophy and microvascular ischemic disease in the white matter. **This report has been created using voice recognition software. It may contain minor errors which are inherent in voice recognition technology. **      Final report electronically signed by Dr Joyce Villaseñor on 8/15/2020 7:06 PM      XR CHEST PORTABLE   Final Result   Left basilar opacities which may represent infiltrates or atelectasis. **This report has been created using voice recognition software. It may contain minor errors which are inherent in voice recognition technology. **      Final report electronically signed by Dr Nomi Sheriff on 8/15/2020 6:54 PM      XR TIBIA FIBULA LEFT (2 VIEWS)   Final Result   Some soft tissue swelling. No acute fractures. **This report has been created using voice recognition software. It may contain minor errors which are inherent in voice recognition technology. **      Final report electronically signed by Dr Nomi Sheriff on 8/15/2020 7:12 PM      XR FEMUR LEFT (MIN 2 VIEWS)   Final Result   Degenerative changes with no acute fracture. **This report has been created using voice recognition software. It may contain minor errors which are inherent in voice recognition technology. **      Final report electronically signed by Dr Nomi Sheriff on 8/15/2020 7:11 PM        Xr Chest (2 Vw)    Result Date: 8/16/2020  PROCEDURE: XR CHEST (2 VW) CLINICAL INFORMATION: Atelectasis versus infiltrate. COMPARISON: 8/15/2020. TECHNIQUE: AP upright and lateral views of the chest performed. FINDINGS: POSTSURGICAL CHANGES: 1. Partially imaged plate and screw device proximal left humerus. LINES/TUBES/MECHANICAL DEVICES: None. TRACHEA/HEART/MEDIASTINUM/HILUM: 1. Stable mild prominence of the cardiac silhouette. 2. There is mild atheromatous calcification at the aortic arch. 3. Stable tracheal and bronchial calcification. LUNG MARIN: 1. There is stable infiltrates throughout both lung fields with a predominant perihilar distribution. There is no pleural effusion. OTHER: None. PNEUMOTHORAX: None. OSSEOUS STRUCTURES: 1.  There is a compression fracture and a level along the mid thoracic spine, which most likely represents T8, with approximately 50% loss of height which lower leg. Some soft tissue swelling. No acute fractures. **This report has been created using voice recognition software. It may contain minor errors which are inherent in voice recognition technology. ** Final report electronically signed by Dr Nomi Sheriff on 8/15/2020 7:12 PM    Ct Head Wo Contrast    Result Date: 8/15/2020  PROCEDURE: CT HEAD WO CONTRAST CLINICAL INFORMATION: multiple falls. COMPARISON: Head CT 2/12/2018. TECHNIQUE: Noncontrast 5 mm axial images were obtained through the brain. All CT scans at this facility use dose modulation, iterative reconstruction, and/or weight-based dosing when appropriate to reduce radiation dose to as low as reasonably achievable. FINDINGS: There is generalized prominence of the sulci and gyri consistent with age-related volume loss. There is no hemorrhage. There are no intra-or extra-axial collections. There is no hydrocephalus, midline shift or mass effect. The gray-white matter differentiation is preserved. There is hypoattenuation in the periventricular white matter likely related to chronic microvascular ischemic disease. The paranasal sinuses and mastoid air cells are normally aerated. There is no suspicious calvarial abnormality. 1. No acute intracranial hemorrhage, mass effect or midline shift. 2. Chronic senescent changes including generalized atrophy and microvascular ischemic disease in the white matter. **This report has been created using voice recognition software. It may contain minor errors which are inherent in voice recognition technology. ** Final report electronically signed by Dr Nomi Sheriff on 8/15/2020 7:06 PM    Xr Chest Portable    Result Date: 8/15/2020  PROCEDURE: XR CHEST PORTABLE CLINICAL INFORMATION: weakness, falls. COMPARISON: Chest x-ray 2/12/2018. TECHNIQUE: AP upright view of the chest was obtained. FINDINGS: There are some opacities at the left lung base which may represent infiltrates or atelectasis.  The cardiac silhouette and pulmonary vasculature are within normal limits. There is no significant pleural effusion or pneumothorax. Visualized portions of the upper abdomen are within normal limits. The osseous structures are intact. No acute fractures or suspicious osseous lesions. Left basilar opacities which may represent infiltrates or atelectasis. **This report has been created using voice recognition software. It may contain minor errors which are inherent in voice recognition technology. ** Final report electronically signed by Dr Darryl Bender on 8/15/2020 6:54 PM      Tele:   [] yes             [x] no      Active Hospital Problems    Diagnosis Date Noted    Recurrent falls [R29.6] 08/15/2020       Electronically signed by Jabari Tarango PA-C on 8/16/2020 at 9:52 AM

## 2020-08-16 NOTE — PROGRESS NOTES
Pt admitted to  8AB26 via from ED from ED. Complaints: Recurrent Falls. IV none infusing into the hand left, condition patent and no redness and antecubital left, condition patent and no redness at a rate of 0 mls/ hour. IV site free of s/s of infection or infiltration. Vital signs obtained. Assessment and data collection initiated. Two nurse skin assessment performed by Eusebio Sanderson RN and Asher Emanuel RN. Oriented to room. Policies and procedures for 8AB explained. All questions answered with no further questions at this time. Fall prevention and safety brochure discussed with patient. Bed alarm on. Call light in reach. Oriented to room. Osorio Machado RN 8/16/2020 2:01 AM     Explained patients right to have family, representative or physician notified of their admission. Patient has Declined for physician to be notified. Patient has Declined for family/representative to be notified. Patient would like family notified once per shift?  No

## 2020-08-17 PROCEDURE — 97163 PT EVAL HIGH COMPLEX 45 MIN: CPT

## 2020-08-17 PROCEDURE — 6360000002 HC RX W HCPCS: Performed by: FAMILY MEDICINE

## 2020-08-17 PROCEDURE — 97535 SELF CARE MNGMENT TRAINING: CPT

## 2020-08-17 PROCEDURE — 99232 SBSQ HOSP IP/OBS MODERATE 35: CPT | Performed by: PHYSICIAN ASSISTANT

## 2020-08-17 PROCEDURE — 96372 THER/PROPH/DIAG INJ SC/IM: CPT

## 2020-08-17 PROCEDURE — 1200000003 HC TELEMETRY R&B

## 2020-08-17 PROCEDURE — 6370000000 HC RX 637 (ALT 250 FOR IP): Performed by: FAMILY MEDICINE

## 2020-08-17 PROCEDURE — 2580000003 HC RX 258: Performed by: FAMILY MEDICINE

## 2020-08-17 PROCEDURE — 97530 THERAPEUTIC ACTIVITIES: CPT

## 2020-08-17 PROCEDURE — 94760 N-INVAS EAR/PLS OXIMETRY 1: CPT

## 2020-08-17 PROCEDURE — 97116 GAIT TRAINING THERAPY: CPT

## 2020-08-17 RX ADMIN — AMLODIPINE BESYLATE 2.5 MG: 2.5 TABLET ORAL at 21:45

## 2020-08-17 RX ADMIN — CEFTRIAXONE SODIUM 1 G: 1 INJECTION, POWDER, FOR SOLUTION INTRAMUSCULAR; INTRAVENOUS at 21:45

## 2020-08-17 RX ADMIN — Medication 250 MG: at 08:51

## 2020-08-17 RX ADMIN — ASPIRIN 81 MG: 81 TABLET, CHEWABLE ORAL at 08:51

## 2020-08-17 RX ADMIN — ENOXAPARIN SODIUM 40 MG: 40 INJECTION SUBCUTANEOUS at 08:51

## 2020-08-17 RX ADMIN — SODIUM CHLORIDE: 9 INJECTION, SOLUTION INTRAVENOUS at 03:28

## 2020-08-17 RX ADMIN — SERTRALINE HYDROCHLORIDE 50 MG: 50 TABLET, FILM COATED ORAL at 08:51

## 2020-08-17 RX ADMIN — AMLODIPINE BESYLATE 2.5 MG: 2.5 TABLET ORAL at 08:51

## 2020-08-17 RX ADMIN — OMEGA-3-ACID ETHYL ESTERS 3000 MG: 1 CAPSULE, LIQUID FILLED ORAL at 08:51

## 2020-08-17 ASSESSMENT — PAIN SCALES - GENERAL
PAINLEVEL_OUTOF10: 0

## 2020-08-17 NOTE — PROGRESS NOTES
[facility] 8B  Occupational Therapy  Daily Note  Time:   Time In: 1143  Time Out: 1447  Timed Code Treatment Minutes: 31 Minutes  Minutes: 31          Date: 2020  Patient Name: Radha Bedolla,   Gender: female      Room: [facility]/026-A  MRN: [id number]  : 1933  (80 y.o.)  Referring Practitioner: Chilo Fam MD  Diagnosis: Recurrent falls  Additional Pertinent Hx: Pt presents to ED s/p 2 falls at home, one outside and one in bathtub,  also reports increased confusion over past few days    Restrictions/Precautions:  Restrictions/Precautions: General Precautions, Fall Risk     SUBJECTIVE: Nurse ok'd session. Patient lying in bed upon arrival. Agreeable to OT sessoin    PAIN: Complains of pain in L foot. Did not rate    COGNITION: Decreased Safety Awareness    ADL:   Grooming: Stand By Assistance. Standing sinkside to wash hands after toileting   Bathing: Minimal Assistance. For thoroughness of bottom. Able to wash edil area while standing with close SBA. Washed remaining areas seated EOB with supervision  Upper Extremity Dressing: with set-up. To doff/don gown seated EOB  Lower Extremity Dressing: Minimal Assistance. To don L sock. Able to don R sock. .  Toileting: Stand by Assistance for hygiene seated on STS   Toilet Transfer: Minimal Assistance from STS; CGA to STS     BALANCE:  Sitting Balance:  Supervision  Standing Balance: Stand by Assistance. BED MOBILITY:  Supine to Sit: Stand By Assistance - increased time and use of side rail    TRANSFERS:  Sit to Stand:  Minimal Assistance. From STS; Contact Guard Assistance from other various surfaces  Stand to Sit: [address] To various surfaces    FUNCTIONAL MOBILITY:  Assistive Device: Rolling Walker  Assist Level:  Stand By Assistance. Distance: To and from bathroom  Slow pace, no LOB noted.  Minimal vcs provided for walker safety     ASSESSMENT:      Activity Tolerance:  Patient tolerance of

## 2020-08-17 NOTE — PROGRESS NOTES
Hospitalist Progress Note    Patient:  Sravani Taylor    Unit/Bed:8B-26/026-A  YOB: 1933  MRN: [id number]   Acct: [de-identified]   PCP: Karen Cleary MD  Code Status: Full Code  Date of Admission: 8/15/2020    Expected Discharge: 8/17? Disposition: SW to assist, awaiting recs from PT/OT. Assessment/Plan:    1. Acute metabolic encephalopathy: d/t #2, see below. Per , pt has been more confused for last couple days. Usually oriented to place and time. Pt is AOx3 at this time. 2. Acute cystitis: UA grossly infected; +dysuria, confusion. Urine was not sent for culture prior to starting abx, will order. Continue ceftriaxone (start date 8/15). 3. Essential HTN: resume home meds, monitor. BP stable. 4. Hx CVA: 2014; stable. 5. Mechanical Falls: Imaging negative. PT/OT. 6. Thrombocytosis, chronic: this is chronic, dating back years. Chief Complaint: fall    HPI / Hospital Course: \"80 y.o. female with pmhx HTN, UTI, CVA, who presented to [facility] with c/o fall x2.  states she has had more confusion for the last 1-2 days with atleast 2 falls. She fell last night, and  later found her out in the yard laying in the flower bed. He was unable to get her up, so he called her son. Then the next morning, she fell in the bathtub and  was unable to pick em up. She called EMS and they brought her to the ER. No LOC. Pt doesn't remember this at all. She has some underlying dementia possibly. Pt was found to have a UTI as well. She does have a history of UTIs. Pt is currently comfortable. She tells me she does have dysuria  No fever, chills, n/v.\"     Subjective (past 24 hours): No acute events overnight. Patient is AAO x3. She states urinary symptoms have improved. Per nursing she is a heavy two person assist with a walker. Will await PT/OT recommendations and social work to assist with discharge planning.       ROS: Pertinent positives as noted in HPI. All other systems reviewed and negative. Past medical history, family history, social history and allergies reviewed again and is unchanged since admission. Medications:  Reviewed. Infusion Medications    sodium chloride 75 mL/hr at 08/17/20 8019     Scheduled Medications    Magnesium Oxide  250 mg Oral Daily    amLODIPine  2.5 mg Oral BID    aspirin  81 mg Oral Daily    omega-3 acid ethyl esters  3,000 mg Oral Daily    sertraline  50 mg Oral Daily    sodium chloride flush  10 mL Intravenous 2 times per day    enoxaparin  40 mg Subcutaneous Daily    cefTRIAXone (ROCEPHIN) IV  1 g Intravenous Q24H     PRN Meds: sodium chloride flush, acetaminophen **OR** acetaminophen, polyethylene glycol, promethazine **OR** ondansetron, potassium chloride, magnesium sulfate    I/O:     Intake/Output Summary (Last 24 hours) at 8/17/2020 1422  Last data filed at 8/17/2020 0335  Gross per 24 hour   Intake 1514.8 ml   Output 425 ml   Net 1089.8 ml       Diet:  DIET CARDIAC; Exam:  BP (!) 119/56   Pulse 78   Temp 98.7 °F (37.1 °C) (Oral)   Resp 18   Wt 217 lb 6 oz (98.6 kg)   SpO2 96%   BMI 35.09 kg/m²   General:   Pleasant elderly female. NAD. HEENT:  normocephalic and atraumatic. No scleral icterus. PERR. Neck: supple. No JVD. No thyromegaly. Lungs: clear to auscultation. No retractions  Cardiac: RRR without murmur. Abdomen: soft. Nontender. Bowel sounds positive. Extremities:  No clubbing, cyanosis, or edema x 4. Vasculature: capillary refill < 3 seconds. Palpable LE pulses bilaterally. Skin:  warm and dry. Psych:  Alert and oriented x3. Affect appropriate  Lymph:  No supraclavicular adenopathy. Neurologic:  No focal deficit. No seizures.       Data: (All radiographs, tracings, PFTs, and imaging are personally viewed and interpreted unless otherwise noted)  Labs:   Recent Labs     08/15/20  1750 08/16/20  0736   WBC 16.6* 15.5*   HGB 15.7 14.7   HCT 49.9* 46.7 * 481*     Recent Labs     08/15/20  1750 08/16/20  0736    141   K 4.3 3.7    105   CO2 25 25   BUN 16 12   CREATININE 0.6 0.6   CALCIUM 9.2 8.6   PHOS 3.3  --      Recent Labs     08/15/20  1750 08/16/20  0736   AST 49* 60*   ALT 19 20   BILITOT 0.7 0.8   ALKPHOS 107 94     Recent Labs     08/15/20  1750   INR 1.20*     Recent Labs     08/15/20  1750 08/16/20  0736   CKTOTAL 612* 671*     Urinalysis:   Lab Results   Component Value Date    NITRU POSITIVE 08/15/2020    WBCUA 5-9 08/15/2020    BACTERIA MANY 08/15/2020    RBCUA 0-2 08/15/2020    BLOODU NEGATIVE 08/15/2020    GLUCOSEU NEGATIVE 08/15/2020     Urine culture:   Lab Results   Component Value Date    LABURIN [place] count: >100,000 CFU/mL 11/30/2018     Micro:   Blood culture #1:   Lab Results   Component Value Date    BC No growth-preliminary  No growth   07/01/2015     Blood culture #2:No results found for: BLOODCULT2  Organism:  Lab Results   Component Value Date    ORG Escherichia coli 11/30/2018       No results found for: LABGRAM  MRSA culture only:No results found for: [address] [place] [address]  Respiratory culture: No results found for: CULTRESP  Aerobic and Anaerobic :  No results found for: LABAERO  No results found for: [address]    Radiology Reports:  XR CHEST (2 VW)   Final Result   1. There is stable infiltrates throughout both lung fields with a predominant perihilar distribution. There is no pleural effusion. Follow-up chest radiographs are recommended to confirm complete resolution. 2. Additional findings as described in the body the report. **This report has been created using voice recognition software. It may contain minor errors which are inherent in voice recognition technology. **      Final report electronically signed by Dr. Osvaldo Lopez on 8/16/2020 7:34 AM      CT HEAD WO CONTRAST   Final Result       1. No acute intracranial hemorrhage, mass effect or midline shift.    2. Chronic senescent changes including generalized lung fields with a predominant perihilar distribution. There is no pleural effusion. OTHER: None. PNEUMOTHORAX: None. OSSEOUS STRUCTURES: 1. There is a compression fracture and a level along the mid thoracic spine, which most likely represents T8, with approximately 50% loss of height which is age-indeterminate however new since 2/12/2018. There is also a compression fracture along the thoracolumbar junction, which most likely represents L1, with approximately 30% loss of height which is age-indeterminate however new since 2/12/2018. 1. There is stable infiltrates throughout both lung fields with a predominant perihilar distribution. There is no pleural effusion. Follow-up chest radiographs are recommended to confirm complete resolution. 2. Additional findings as described in the body the report. **This report has been created using voice recognition software. It may contain minor errors which are inherent in voice recognition technology. ** Final report electronically signed by Dr. Brittney Gutierrez on 8/16/2020 7:34 AM    Xr Femur Left (min 2 Views)    Result Date: 8/15/2020  PROCEDURE: XR FEMUR LEFT (MIN 2 VIEWS) CLINICAL INFORMATION: 66-year-old female who fell. . COMPARISON: No prior study. TECHNIQUE: 4 views of the left femur were obtained. FINDINGS: There is generalized osteopenia. There is narrowing at the hip and knee joints. There is no acute fracture or dislocation. Vascular calcifications are seen in the soft tissues. Degenerative changes with no acute fracture. **This report has been created using voice recognition software. It may contain minor errors which are inherent in voice recognition technology. ** Final report electronically signed by Dr Diane Cerrato on 8/15/2020 7:11 PM    Xr Tibia Fibula Left (2 Views)    Result Date: 8/15/2020  PROCEDURE: XR TIBIA FIBULA LEFT (2 VIEWS) CLINICAL INFORMATION: 66-year-old female who fell. COMPARISON: No prior study.  TECHNIQUE: 4 views of the left tibia and fibula were obtained. FINDINGS: There is no acute fracture or dislocation. There is narrowing at the knee joint. The ankle joint is intact. There are vascular calcifications in the soft tissues. There appears to be soft tissue swelling at the anterior aspect of the superior aspect of the lower leg. Some soft tissue swelling. No acute fractures. **This report has been created using voice recognition software. It may contain minor errors which are inherent in voice recognition technology. ** Final report electronically signed by Dr Heath Sotomayor on 8/15/2020 7:12 PM    Ct Head Wo Contrast    Result Date: 8/15/2020  PROCEDURE: CT HEAD WO CONTRAST CLINICAL INFORMATION: multiple falls. COMPARISON: Head CT 2/12/2018. TECHNIQUE: Noncontrast 5 mm axial images were obtained through the brain. All CT scans at this facility use dose modulation, iterative reconstruction, and/or weight-based dosing when appropriate to reduce radiation dose to as low as reasonably achievable. FINDINGS: There is generalized prominence of the sulci and gyri consistent with age-related volume loss. There is no hemorrhage. There are no intra-or extra-axial collections. There is no hydrocephalus, midline shift or mass effect. The gray-white matter differentiation is preserved. There is hypoattenuation in the periventricular white matter likely related to chronic microvascular ischemic disease. The paranasal sinuses and mastoid air cells are normally aerated. There is no suspicious calvarial abnormality. 1. No acute intracranial hemorrhage, mass effect or midline shift. 2. Chronic senescent changes including generalized atrophy and microvascular ischemic disease in the white matter. **This report has been created using voice recognition software. It may contain minor errors which are inherent in voice recognition technology. ** Final report electronically signed by Dr Heath Sotomayor on 8/15/2020 7:06 PM    Xr Chest Portable    Result Date: 8/15/2020  PROCEDURE: XR CHEST PORTABLE CLINICAL INFORMATION: weakness, falls. COMPARISON: Chest x-ray 2/12/2018. TECHNIQUE: AP upright view of the chest was obtained. FINDINGS: There are some opacities at the left lung base which may represent infiltrates or atelectasis. The cardiac silhouette and pulmonary vasculature are within normal limits. There is no significant pleural effusion or pneumothorax. Visualized portions of the upper abdomen are within normal limits. The osseous structures are intact. No acute fractures or suspicious osseous lesions. Left basilar opacities which may represent infiltrates or atelectasis. **This report has been created using voice recognition software. It may contain minor errors which are inherent in voice recognition technology. ** Final report electronically signed by Dr Katelynn Grubbs on 8/15/2020 6:54 PM      Tele:   [] yes             [x] no      Active Hospital Problems    Diagnosis Date Noted    Recurrent falls [R29.6] 08/15/2020       Electronically signed by Rita Gallo PA-C on 8/17/2020 at 2:22 PM

## 2020-08-17 NOTE — PROGRESS NOTES
reportedly demonstated increased furniture walking. pt's daughter who is an occupational therapist is moving home from [place] in Sept    OBJECTIVE:  Range of Motion:  Bilateral Lower Extremity: WFL    Strength:  Bilateral Lower Extremity: WFL, generalized weakness    Balance:  Static Sitting Balance:  Supervision  Dynamic Sitting Balance: Stand By Assistance  Static Standing Balance: Stand By Assistance  Dynamic Standing Balance: Contact Guard Assistance  Reaching shoulder to waist level OBOS, unsteady, no LOB  Bed Mobility:  Rolling to Left: Modified Independent   Rolling to Right: Modified Independent   Supine to Sit: Stand By Assistance  Sit to Supine: Stand By Assistance   Scooting: Stand By Assistance  HOB flat and no BR, inc time to complete  Transfers:  Sit to Stand: Contact Guard Assistance  Stand to [address] for hand placement for safety, inc time to complete  Ambulation:  Contact Guard Assistance  Distance: 10'x1, 45'x1  Surface: Level Tile  Device:Rolling Walker  Gait Deviations: Forward Flexed Posture, Slow Lizz and step to gait pattern leading with LLE with c/o left foot pain with WB-per pt did feel better with inc distance, unsteady, constant cues throughout for pt to amb closer to walker for safety        Functional Outcome Measures: Completed  AM-PAC Inpatient Mobility Raw Score : 19  AM-PAC Inpatient T-Scale Score : 45.44    ASSESSMENT:  Activity Tolerance:  Patient tolerance of  treatment: fair. Treatment Initiated: Treatment and education initiated within context of evaluation. Evaluation time included review of current medical information, gathering information related to past medical, social and functional history, completion of standardized testing, formal and informal observation of tasks, assessment of data and development of plan of care and goals.   Treatment time included skilled education and facilitation of tasks to increase safety and independence with functional mobility for improved independence and quality of life. Assessment: Body structures, Functions, Activity limitations: Decreased functional mobility , Decreased balance, Decreased strength, Decreased safe awareness  Assessment: pt with dec strength/endurance/balance, multiple cues for safety with mobility and use of walker, inc assist for safe mobility, steps to enter home, recommend cont PT to inc pt functional mobility  Prognosis: Good    REQUIRES PT FOLLOW UP: Yes    Discharge Recommendations:  Discharge Recommendations: Continue to assess pending progress, 24 hour supervision or assist, Patient would benefit from continued therapy after discharge(pt would benefit from a cont inpt therapy stay for safety)    Patient Education:  PT Education: Goals, PT Role, Plan of Care, Functional Mobility Training, Adaptive Device Training    Equipment Recommendations: Other: cont to assess for pt needs    Plan:  Times per week: 3-5X GM  Times per day: Daily  Specific instructions for Next Treatment: therex and mobility    Goals:  Patient goals : not stated  Short term goals  Time Frame for Short term goals: by discharge  Short term goal 1: bed mobility with S to get in/out of bed  Short term goal 2: transfer with S to get in/out of chairs  Short term goal 3: amb 75'x1 with RW and S to walk safely in home  Short term goal 4: negotiate step with HR and CGA to enter home safely  Long term goals  Time Frame for Long term goals : no LTGs set secondary to short ELOS    Following session, patient left in safe position with all fall risk precautions in place.

## 2020-08-17 NOTE — CARE COORDINATION
20, 11:22 AM EDT  DISCHARGE PLANNING EVALUATION:    Camron Cast       Admitted from: ED 8/15/2020/ [address] [facility] day: 0   [facility] Reason for admit: Recurrent falls [R29.6] Status: IP  Admit order signed?: yes  PMH:  has a past medical history of Blood circulation, collateral, Cataract, Hypertension, Unspecified cerebral artery occlusion with cerebral infarction, and UTI (lower urinary tract infection). Procedure: none  Pertinent abnormal Imagin/16 CXR:   1. There is stable infiltrates throughout both lung fields with a predominant perihilar distribution. There is no pleural effusion. Follow-up chest radiographs are recommended to confirm complete resolution. 2. Additional findings as described in the body the report. Medications:  Scheduled Meds:   Magnesium Oxide  250 mg Oral Daily    amLODIPine  2.5 mg Oral BID    aspirin  81 mg Oral Daily    omega-3 acid ethyl esters  3,000 mg Oral Daily    sertraline  50 mg Oral Daily    sodium chloride flush  10 mL Intravenous 2 times per day    enoxaparin  40 mg Subcutaneous Daily    cefTRIAXone (ROCEPHIN) IV  1 g Intravenous Q24H     Continuous Infusions:   sodium chloride 75 mL/hr at 20 3918      Pertinent Info/Orders/Treatment Plan:  Presented to ED after falling x 2 at home. No fractures. Hospitalist following. PT/OT. + UTI. IVF. IV rocephin. Diet: DIET CARDIAC;   Smoking status:  reports that she quit smoking about 3 years ago. She quit after 2.00 years of use.  She has never used smokeless tobacco.   PCP: Karen Ugarte MD  Readmission 30 days or less: no   %    Discharge Planning Evaluation  Current Residence:  Private Residence  Living Arrangements:  Spouse/Significant Other   Support Systems:  Spouse/Significant Other, Children  Current Services PTA:     Potential Assistance Needed:  N/A  Potential Assistance Purchasing Medications:  No  Does patient want to participate in local refill/ meds to beds program? No  Type of Home Care Services:  None  Patient expects to be discharged to:     Expected Discharge date: Follow Up Appointment: Best Day/ Time: Thursday PM    Patient Goals/Plan/Treatment Preferences: Patient is from home with . Per report will need HH at minimum. Await therapy recommendations. SW consulted. Transportation/Food Security/Housekeeping Addressed:  No issues identified.     Evaluation: yes

## 2020-08-17 NOTE — PLAN OF CARE
Problem: Falls - Risk of:  Goal: Will remain free from falls  Description: Will remain free from falls  Outcome: Ongoing  Note: Patient free from falls. Fall precautions in place. Patient encouraged to use call light. Problem: Falls - Risk of:  Goal: Absence of physical injury  Description: Absence of physical injury  Outcome: Ongoing  Note: Patient free from physical injury. Care plan reviewed with patient, no questions or concerns at this time.

## 2020-08-18 VITALS
HEART RATE: 86 BPM | SYSTOLIC BLOOD PRESSURE: 171 MMHG | OXYGEN SATURATION: 96 % | RESPIRATION RATE: 18 BRPM | TEMPERATURE: 98.6 F | WEIGHT: 202.6 LBS | DIASTOLIC BLOOD PRESSURE: 87 MMHG | BODY MASS INDEX: 32.7 KG/M2

## 2020-08-18 LAB
ORGANISM: ABNORMAL
URINE CULTURE, ROUTINE: ABNORMAL
URINE CULTURE, ROUTINE: ABNORMAL

## 2020-08-18 PROCEDURE — 2580000003 HC RX 258: Performed by: FAMILY MEDICINE

## 2020-08-18 PROCEDURE — 97535 SELF CARE MNGMENT TRAINING: CPT

## 2020-08-18 PROCEDURE — 99239 HOSP IP/OBS DSCHRG MGMT >30: CPT | Performed by: PHYSICIAN ASSISTANT

## 2020-08-18 PROCEDURE — 97530 THERAPEUTIC ACTIVITIES: CPT

## 2020-08-18 PROCEDURE — 6370000000 HC RX 637 (ALT 250 FOR IP): Performed by: FAMILY MEDICINE

## 2020-08-18 PROCEDURE — 6360000002 HC RX W HCPCS: Performed by: FAMILY MEDICINE

## 2020-08-18 RX ADMIN — SERTRALINE HYDROCHLORIDE 50 MG: 50 TABLET, FILM COATED ORAL at 09:26

## 2020-08-18 RX ADMIN — ASPIRIN 81 MG: 81 TABLET, CHEWABLE ORAL at 09:26

## 2020-08-18 RX ADMIN — AMLODIPINE BESYLATE 2.5 MG: 2.5 TABLET ORAL at 09:26

## 2020-08-18 RX ADMIN — SODIUM CHLORIDE: 9 INJECTION, SOLUTION INTRAVENOUS at 09:26

## 2020-08-18 RX ADMIN — Medication 250 MG: at 09:26

## 2020-08-18 RX ADMIN — OMEGA-3-ACID ETHYL ESTERS 3000 MG: 1 CAPSULE, LIQUID FILLED ORAL at 09:26

## 2020-08-18 RX ADMIN — ENOXAPARIN SODIUM 40 MG: 40 INJECTION SUBCUTANEOUS at 09:26

## 2020-08-18 RX ADMIN — SODIUM CHLORIDE: 9 INJECTION, SOLUTION INTRAVENOUS at 00:07

## 2020-08-18 ASSESSMENT — PAIN SCALES - GENERAL
PAINLEVEL_OUTOF10: 0
PAINLEVEL_OUTOF10: 0

## 2020-08-18 NOTE — PROGRESS NOTES
[facility]  Occupational Therapy  Daily Note  Time:   Time In: 1004  Time Out: 1033  Timed Code Treatment Minutes: 29 Minutes  Minutes: 29          Date: 2020  Patient Name: Lanette Rich,   Gender: female      Room: -26/026-A  MRN: [id number]  : 1933  (80 y.o.)  Referring Practitioner: Reginaldo Boothe MD  Diagnosis: Recurrent falls  Additional Pertinent Hx: Pt presents to ED s/p 2 falls at home, one outside and one in bathtub,  also reports increased confusion over past few days    Restrictions/Precautions:  Restrictions/Precautions: General Precautions, Fall Risk     SUBJECTIVE: Pt seated in bedside chair upon arrival, agreeable to OT session. RN okayed therapy session stating pt is to be d/c home today with . PAIN: No c/o. COGNITION: Decreased Insight, Decreased Problem Solving and Decreased Safety Awareness    ADL:   Grooming: Contact Guard Assistance. Standing sink side for oral care with 1 UE release from countertop. BALANCE:  Sitting Balance:  Supervision. Bedside chiar. Standing Balance: Contact Guard Assistance. x4 minutes standing sink side for oral care task. BED MOBILITY:  Not Tested    TRANSFERS:  Sit to Stand:  Minimal Assistance. Stand to Sit: Contact Guard Assistance. FUNCTIONAL MOBILITY:  Assistive Device: Rolling Walker  Assist Level:  Contact Guard Assistance and with increased time for completion. Distance: To and from bathroom  Completed functional mobility to/from BR at very slow pace, no LOB noted- pt taking very small steps and tends to let RLE lag and requires max cues to put equal weight through BLE. Pt requires min cues for walker safety to keep close to YONATHAN reaching destination- lengthy seated rest break after trial of mobility, mod fatigue noted. ADDITIONAL ACTIVITIES:  Patient participated in functional DME education regarding bathroom safety with safe toilet transfers.  Pt educated on benefits on [facility] for over toilet at home and tub transfer bench to increase safety with tub transfers. Pt demo'ed good understanding of education and agreeable to obtaining BSC before d/c.    ASSESSMENT:     Activity Tolerance:  Patient tolerance of  treatment: fair. Pt limited by pain/fatigue. Discharge Recommendations: Recommend SNF as pt is not safe to go home at this time. Per nursing and case management notes family is refusing ECF and will return home 24 hour supervision or assist, notified nursing of needs for pt to have Home with Home health OT(Patient would benefit from continued therapy)   Equipment Recommendations: Equipment Needed: Yes  Other: recommend RW and BSC at this time  Plan: Times per week: 5x  Current Treatment Recommendations: Strengthening, Endurance Training, Balance Training, Functional Mobility Training, Equipment Evaluation, Education, & procurement, Self-Care / ADL, Patient/Caregiver Education & Training, Home Management Training, Safety Education & Training    Patient Education  Patient Education: ADL's, Equipment Education, Importance of Increasing Activity, Assistive Device Safety and Safety with transfers and mobility. Goals  Short term goals  Time Frame for Short term goals: By discharge  Short term goal 1: Pt will safely navigate to/from bathroom and around environemental barriers with no > SBA and 0 cues for RW safety for inc indep accessing environment  Short term goal 2: Pt will demonstrate standing endurance >6 mins with 1-2 UE release at SBA in prep for simple IADLs  Short term goal 3: Pt will complete various functional t/fs with SBA and 0 cues for safety for inc indep with toileting    Following session, patient left in safe position with all fall risk precautions in place.

## 2020-08-18 NOTE — PROGRESS NOTES
Date: 2020  Patient Name: Johann Tavera        MRN: [id number]   Account: [de-identified]   : 1933  (80 y.o.)  Gender: female   Referring Practitioner: Jarad Mishra MD  Diagnosis: Recurrent falls  Additional Pertinent Hx: Pt presents to ED s/p 2 falls at home, one outside and one in bathtub,  also reports increased confusion over past few days    Johann Tavera requires a Bedside Commode to complete bathing, toileting, dressing and grooming tasks. Patient requires a Bedside Commode due to Upper Extremity/Lower Extremity Weakness and limited ambulation and is unable to walk to the bathroom at home. Without the Bedside Commode, Johann Tavera is at increased risk for falls and would require increased assistance for ADL's and mobility.

## 2020-08-18 NOTE — PLAN OF CARE
Problem: Falls - Risk of:  Goal: Will remain free from falls  Description: Will remain free from falls  Outcome: Ongoing  Note: Patient free from falls. Fall precautions in place. Patient encouraged to use call light. Problem: Falls - Risk of:  Goal: Absence of physical injury  Description: Absence of physical injury  Outcome: Ongoing  Note: Patient free from physical injury. Problem: Musculor/Skeletal Functional Status  Goal: Highest potential functional level  Outcome: Ongoing  Note: Patient working with pt and ot. Patient ambulating with walker. Problem: DISCHARGE BARRIERS  Goal: Patient's continuum of care needs are met  Outcome: Ongoing  Note: Social work helping with discharge planning. Care plan reviewed with patient, no questions or concerns at this time.

## 2020-08-18 NOTE — DISCHARGE SUMMARY
Hospitalist Discharge Summary    Patient: Sherryle Greenhouse  YOB: 1933  MRN: [id number]   Acct: [de-identified]    Primary Care Physician: Heidy Holguin MD    Admit date  8/15/2020    Discharge date:  8/17/2020  Disposition: Home with home health      Discharge Assessment and Plan:    1. Acute metabolic encephalopathy: d/t #2, see below. Resolved, pt at baseline.       2. Acute cystitis: UA grossly infected; +dysuria, confusion. Urine culture only with mild growth. Pt treated with IV ceftriaxone, sx resolved.      3. Essential HTN: resume home meds. BP stable. Follow up with PCP.       4. Hx CVA: 2014; stable.      5. Mechanical Falls, physical deconditioning: Imaging negative. Recommended SNF [place]; however, pt and  refused. Discharged with home health.      6. Thrombocytosis, chronic: this is chronic, dating back years. Repeat CBC OP and follow up with PCP as scheduled. Chief Complaint on presentation: fall    Initial H&P / Hospital Course: \"87 y. o. female with pmhx HTN, UTI, CVA, who presented to [address] [place] [address] with c/o fall x2.  states she has had more confusion for the last 1-2 days with atleast 2 falls. She fell last night, and  later found her out in the yard laying in the flower bed. He was unable to get her up, so he called her son. Then the next morning, she fell in the bathtub and  was unable to pick em up. She called EMS and they brought her to the ER. No LOC. Pt doesn't remember this at all. She has some underlying dementia possibly. Pt was found to have a UTI as well. She does have a history of UTIs. Pt is currently comfortable. She tells me she does have dysuria  No fever, chills, n/v.\"     Subjective (day of discharge): PT/OT recommending SNF for patient; however, pt and  refused. Agreed to home health. Pt denies fever/chills, abd pain, urinary sx, SOB, CP. She is AOx3.  Pt responded well to medical management and is discharged in SARS-CoV-2, NAAT NOT DETECTED NOT DETECT   Lactic acid, plasma    Collection Time: 08/16/20  7:36 AM   Result Value Ref Range    Lactic Acid 1.0 0.5 - 2.2 mmol/L   Comprehensive Metabolic Panel w/ Reflex to MG    Collection Time: 08/16/20  7:36 AM   Result Value Ref Range    Glucose 111 (H) 70 - 108 mg/dL    CREATININE 0.6 0.4 - 1.2 mg/dL    BUN 12 7 - 22 mg/dL    Sodium 141 135 - 145 meq/L    Potassium reflex Magnesium 3.7 3.5 - 5.2 meq/L    Chloride 105 98 - 111 meq/L    CO2 25 23 - 33 meq/L    Calcium 8.6 8.5 - 10.5 mg/dL    AST 60 (H) 5 - 40 U/L    Alkaline Phosphatase 94 38 - 126 U/L    Total Protein 6.3 6.1 - 8.0 g/dL    Alb 3.7 3.5 - 5.1 g/dL    Total Bilirubin 0.8 0.3 - 1.2 mg/dL    ALT 20 11 - 66 U/L   CBC auto differential    Collection Time: 08/16/20  7:36 AM   Result Value Ref Range    WBC 15.5 (H) 4.8 - 10.8 thou/mm3    RBC 4.78 4.20 - 5.40 mill/mm3    Hemoglobin 14.7 12.0 - 16.0 gm/dl    Hematocrit 46.7 37.0 - 47.0 %    MCV 97.7 81.0 - 99.0 fL    MCH 30.8 26.0 - 33.0 pg    MCHC 31.5 (L) 32.2 - 35.5 gm/dl    RDW-CV 13.9 11.5 - 14.5 %    RDW-SD 49.8 (H) 35.0 - 45.0 fL    Platelets 615 (H) 752 - 400 thou/mm3    MPV 11.8 9.4 - 12.4 fL    Seg Neutrophils 78.6 %    Lymphocytes 10.1 %    Monocytes 9.2 %    Eosinophils 0.6 %    Basophils 0.6 %    Immature Granulocytes 0.9 %    Segs Absolute 12.2 (H) 1.8 - 7.7 thou/mm3    Lymphocytes Absolute 1.6 1.0 - 4.8 thou/mm3    Monocytes Absolute 1.4 (H) 0.4 - 1.3 thou/mm3    Eosinophils Absolute 0.1 0.0 - 0.4 thou/mm3    Basophils Absolute 0.1 0.0 - 0.1 thou/mm3    Immature Grans (Abs) 0.14 (H) 0.00 - 0.07 thou/mm3    nRBC 0 /100 wbc   CK    Collection Time: 08/16/20  7:36 AM   Result Value Ref Range    Total  (H) 30 - 135 U/L   Anion Gap    Collection Time: 08/16/20  7:36 AM   Result Value Ref Range    Anion Gap 11.0 8.0 - 16.0 meq/L   Glomerular Filtration Rate, Estimated    Collection Time: 08/16/20  7:36 AM   Result Value Ref Range    Est, Glom Filt Rate >90 ml/min/1.73m2   Culture, Urine    Collection Time: 08/16/20 12:45 PM    Specimen: Urine, clean catch   Result Value Ref Range    Urine Culture, Routine (A)      At least one of drugs in current antibiotic therapy may be ineffective in vitro for isolate. Organism Aerococcus urinae (A)     Urine Culture, Routine       [place] count: 50,000-90,000 CFU/mL A. urinae is generally susceptible to ampicillin, penicillin, amoxicillin, beta lactams, cephalosporins and doxycycline. This organism has variable susceptibility to levofloxacin,vancomycin and trimethoprim-sulfamethoxazole. Microbiology:    Blood culture #1:   Lab Results   Component Value Date    BC No growth-preliminary  No growth   07/01/2015     Blood culture #2:No results found for: April   Organism:  No results found for: LABGRAM  MRSA culture only:No results found for: [facility]  Urine culture:   Lab Results   Component Value Date    [address] [place] [address]  08/16/2020     At least one of drugs in current antibiotic therapy may be ineffective in vitro for isolate. [address] [place] [address]  08/16/2020     [place] count: 50,000-90,000 CFU/mL A. urinae is generally susceptible to ampicillin, penicillin, amoxicillin, beta lactams, cephalosporins and doxycycline. This organism has variable susceptibility to levofloxacin,vancomycin and trimethoprim-sulfamethoxazole. Lab Results   Component Value Date    ORG Aerococcus urinae 08/16/2020      Respiratory culture: No results found for: CULTRESP  Aerobic and Anaerobic :  No results found for: LABAERO  No results found for: LABANAE    Urinalysis:     Lab Results   Component Value Date    NITRU POSITIVE 08/15/2020    WBCUA 5-9 08/15/2020    BACTERIA MANY 08/15/2020    RBCUA 0-2 08/15/2020    BLOODU NEGATIVE 08/15/2020    GLUCOSEU NEGATIVE 08/15/2020       Radiology:  Xr Chest (2 Vw)    Result Date: 8/16/2020  PROCEDURE: XR CHEST (2 VW) CLINICAL INFORMATION: Atelectasis versus infiltrate. COMPARISON: 8/15/2020.  TECHNIQUE: AP upright and lateral views of the chest performed. FINDINGS: POSTSURGICAL CHANGES: 1. Partially imaged plate and screw device proximal left humerus. LINES/TUBES/MECHANICAL DEVICES: None. TRACHEA/HEART/MEDIASTINUM/HILUM: 1. Stable mild prominence of the cardiac silhouette. 2. There is mild atheromatous calcification at the aortic arch. 3. Stable tracheal and bronchial calcification. LUNG MARIN: 1. There is stable infiltrates throughout both lung fields with a predominant perihilar distribution. There is no pleural effusion. OTHER: None. PNEUMOTHORAX: None. OSSEOUS STRUCTURES: 1. There is a compression fracture and a level along the mid thoracic spine, which most likely represents T8, with approximately 50% loss of height which is age-indeterminate however new since 2/12/2018. There is also a compression fracture along the thoracolumbar junction, which most likely represents L1, with approximately 30% loss of height which is age-indeterminate however new since 2/12/2018. 1. There is stable infiltrates throughout both lung fields with a predominant perihilar distribution. There is no pleural effusion. Follow-up chest radiographs are recommended to confirm complete resolution. 2. Additional findings as described in the body the report. **This report has been created using voice recognition software. It may contain minor errors which are inherent in voice recognition technology. ** Final report electronically signed by Dr. Barbie Self on 8/16/2020 7:34 AM    Xr Femur Left (min 2 Views)    Result Date: 8/15/2020  PROCEDURE: XR FEMUR LEFT (MIN 2 VIEWS) CLINICAL INFORMATION: 29-year-old female who fell. . COMPARISON: No prior study. TECHNIQUE: 4 views of the left femur were obtained. FINDINGS: There is generalized osteopenia. There is narrowing at the hip and knee joints. There is no acute fracture or dislocation. Vascular calcifications are seen in the soft tissues. Degenerative changes with no acute fracture.  **This report has been created using voice recognition software. It may contain minor errors which are inherent in voice recognition technology. ** Final report electronically signed by Dr Ricki Louise on 8/15/2020 7:11 PM    Xr Tibia Fibula Left (2 Views)    Result Date: 8/15/2020  PROCEDURE: XR TIBIA FIBULA LEFT (2 VIEWS) CLINICAL INFORMATION: 25-year-old female who fell. COMPARISON: No prior study. TECHNIQUE: 4 views of the left tibia and fibula were obtained. FINDINGS: There is no acute fracture or dislocation. There is narrowing at the knee joint. The ankle joint is intact. There are vascular calcifications in the soft tissues. There appears to be soft tissue swelling at the anterior aspect of the superior aspect of the lower leg. Some soft tissue swelling. No acute fractures. **This report has been created using voice recognition software. It may contain minor errors which are inherent in voice recognition technology. ** Final report electronically signed by Dr Ricki Louise on 8/15/2020 7:12 PM    Ct Head Wo Contrast    Result Date: 8/15/2020  PROCEDURE: CT HEAD WO CONTRAST CLINICAL INFORMATION: multiple falls. COMPARISON: Head CT 2/12/2018. TECHNIQUE: Noncontrast 5 mm axial images were obtained through the brain. All CT scans at this facility use dose modulation, iterative reconstruction, and/or weight-based dosing when appropriate to reduce radiation dose to as low as reasonably achievable. FINDINGS: There is generalized prominence of the sulci and gyri consistent with age-related volume loss. There is no hemorrhage. There are no intra-or extra-axial collections. There is no hydrocephalus, midline shift or mass effect. The gray-white matter differentiation is preserved. There is hypoattenuation in the periventricular white matter likely related to chronic microvascular ischemic disease. The paranasal sinuses and mastoid air cells are normally aerated. There is no suspicious calvarial abnormality.        1. No acute intracranial hemorrhage, mass effect or midline shift. 2. Chronic senescent changes including generalized atrophy and microvascular ischemic disease in the white matter. **This report has been created using voice recognition software. It may contain minor errors which are inherent in voice recognition technology. ** Final report electronically signed by Dr Kaveh Pandey on 8/15/2020 7:06 PM    Xr Chest Portable    Result Date: 8/15/2020  PROCEDURE: XR CHEST PORTABLE CLINICAL INFORMATION: weakness, falls. COMPARISON: Chest x-ray 2/12/2018. TECHNIQUE: AP upright view of the chest was obtained. FINDINGS: There are some opacities at the left lung base which may represent infiltrates or atelectasis. The cardiac silhouette and pulmonary vasculature are within normal limits. There is no significant pleural effusion or pneumothorax. Visualized portions of the upper abdomen are within normal limits. The osseous structures are intact. No acute fractures or suspicious osseous lesions. Left basilar opacities which may represent infiltrates or atelectasis. **This report has been created using voice recognition software. It may contain minor errors which are inherent in voice recognition technology. ** Final report electronically signed by Dr Kaveh Pandey on 8/15/2020 6:54 PM       Consults:   IP CONSULT TO SOCIAL WORK  IP CONSULT TO HOME CARE NEEDS    Discharge Medications:      Medication List      CONTINUE taking these medications    amLODIPine 2.5 MG tablet  Commonly known as:  NORVASC  Take 1 tablet by mouth 2 times daily. aspirin 81 MG chewable tablet  Take 1 tablet by mouth daily.      BACILLUS COAGULANS-INULIN PO     Cinnamon 500 MG Tabs     Cranberry-Vitamin C 90942-036 MG Caps     [facility] Magnesium 250 MG Tabs tablet  Generic drug:  magnesium     D-MANNOSE PO     fish oil 1000 MG Caps     Ginkgo Biloba 120 MG Tabs     NONFORMULARY     NONFORMULARY     NONFORMULARY     sertraline 50 MG tablet  Commonly known as:  ZOLOFT     therapeutic multivitamin-minerals tablet     Turmeric Curcumin 5-1000 MG Caps     Vitamin D3 125 MCG (5000 UT) Tabs             Patient Instructions:    Discharge lab work: cbc  Activity: activity as tolerated and no driving for today  Diet: No diet orders on file      Follow-up visits:   Norbert Romano MD  [address] [facility]  [phone]    On 8/25/2020  1:15 PM wear mask for appointment          Disposition: home  Condition at Discharge: Stable    Time Spent: 50 minutes    Signed: Thank you Norbert Romano MD for the opportunity to be involved in this patient's care.     Electronically signed by Jabari Tarango PA-C on 8/18/2020 at 7:48 PM  Discharging Hospitalist

## 2020-08-18 NOTE — PROGRESS NOTES
Karma Ward was evaluated today and a DME order was entered for a wheeled walker with seat because she requires this to successfully complete daily living tasks of eating, personal cares, ambulating, hygiene and dressing upper body. A wheeled walker with seat is necessary due to the patient's unsteady gait, upper body weakness, inability to  and ambulation device, ambulating only short distances by pushing a walker, and the need to sit for a short time before resuming ambulation. These tasks cannot be completed with a lesser ambulation device such as a cane, crutch, or standard walker. The need for this equipment was discussed with the patient and she understands and is in agreement.

## 2020-08-19 NOTE — CARE COORDINATION
8/19/20, 11:19 AM EDT  Late Entry    Patient goals/plan/ treatment preferences discussed by  and . Patient goals/plan/ treatment preferences reviewed with patient/ family. Patient/ family verbalize understanding of discharge plan and are in agreement with goal/plan/treatment preferences. Understanding was demonstrated using the teach back method. AVS provided by RN at time of discharge, which includes all necessary medical information pertaining to the patients current course of illness, treatment, post-discharge goals of care, and treatment preferences. IMM Letter  IMM Letter given to Patient/Family/Significant other/Guardian/POA/by[de-identified]   IMM Letter date given[de-identified] 08/18/20  IMM Letter time given[de-identified] [id number]       Patient discharged 8/18 to home with spouse and new [address], nursing, aide, PT & OT.   Spoke with Joey Hubbard at [facility], referral accepted, faxed facesheet, H&P, orders and AVS.

## 2022-02-11 ENCOUNTER — APPOINTMENT (OUTPATIENT)
Dept: CT IMAGING | Age: 87
DRG: 690 | End: 2022-02-11
Payer: MEDICARE

## 2022-02-11 ENCOUNTER — APPOINTMENT (OUTPATIENT)
Dept: GENERAL RADIOLOGY | Age: 87
DRG: 690 | End: 2022-02-11
Payer: MEDICARE

## 2022-02-11 ENCOUNTER — HOSPITAL ENCOUNTER (INPATIENT)
Age: 87
LOS: 2 days | Discharge: HOME HEALTH | DRG: 690 | End: 2022-02-14
Attending: STUDENT IN AN ORGANIZED HEALTH CARE EDUCATION/TRAINING PROGRAM | Admitting: HOSPITALIST
Payer: MEDICARE

## 2022-02-11 DIAGNOSIS — N39.0 URINARY TRACT INFECTION WITHOUT HEMATURIA, SITE UNSPECIFIED: Primary | ICD-10-CM

## 2022-02-11 DIAGNOSIS — N30.00 ACUTE CYSTITIS WITHOUT HEMATURIA: ICD-10-CM

## 2022-02-11 DIAGNOSIS — R29.6 RECURRENT FALLS: ICD-10-CM

## 2022-02-11 DIAGNOSIS — R29.6 MULTIPLE FALLS: ICD-10-CM

## 2022-02-11 LAB
ALBUMIN SERPL-MCNC: 4.7 G/DL (ref 3.5–5.1)
ALP BLD-CCNC: 119 U/L (ref 38–126)
ALT SERPL-CCNC: 26 U/L (ref 11–66)
ANION GAP SERPL CALCULATED.3IONS-SCNC: 17 MEQ/L (ref 8–16)
AST SERPL-CCNC: 39 U/L (ref 5–40)
BACTERIA: ABNORMAL /HPF
BILIRUB SERPL-MCNC: 0.7 MG/DL (ref 0.3–1.2)
BILIRUBIN URINE: NEGATIVE
BLOOD, URINE: NEGATIVE
BUN BLDV-MCNC: 19 MG/DL (ref 7–22)
CALCIUM SERPL-MCNC: 9.8 MG/DL (ref 8.5–10.5)
CASTS UA: ABNORMAL /LPF
CHARACTER, URINE: ABNORMAL
CHLORIDE BLD-SCNC: 103 MEQ/L (ref 98–111)
CHP ED QC CHECK: YES
CO2: 23 MEQ/L (ref 23–33)
COLOR: ABNORMAL
CREAT SERPL-MCNC: 0.8 MG/DL (ref 0.4–1.2)
CRYSTALS, UA: ABNORMAL
EPITHELIAL CELLS, UA: ABNORMAL /HPF
GFR SERPL CREATININE-BSD FRML MDRD: 68 ML/MIN/1.73M2
GLUCOSE BLD-MCNC: 139 MG/DL (ref 70–108)
GLUCOSE BLD-MCNC: 150 MG/DL
GLUCOSE BLD-MCNC: 150 MG/DL (ref 70–108)
GLUCOSE URINE: NEGATIVE MG/DL
KETONES, URINE: ABNORMAL
LACTIC ACID, SEPSIS: 1.7 MMOL/L (ref 0.5–1.9)
LEUKOCYTE ESTERASE, URINE: ABNORMAL
NITRITE, URINE: POSITIVE
OSMOLALITY CALCULATION: 289.5 MOSMOL/KG (ref 275–300)
PH UA: 5.5 (ref 5–9)
POTASSIUM REFLEX MAGNESIUM: 4.3 MEQ/L (ref 3.5–5.2)
PRO-BNP: 834.1 PG/ML (ref 0–1800)
PROTEIN UA: 30
RBC URINE: ABNORMAL /HPF
SARS-COV-2, NAAT: NOT  DETECTED
SODIUM BLD-SCNC: 143 MEQ/L (ref 135–145)
SPECIFIC GRAVITY, URINE: 1.02 (ref 1–1.03)
TOTAL PROTEIN: 8 G/DL (ref 6.1–8)
TROPONIN T: < 0.01 NG/ML
UROBILINOGEN, URINE: 1 EU/DL (ref 0–1)
WBC UA: ABNORMAL /HPF

## 2022-02-11 PROCEDURE — 87186 SC STD MICRODIL/AGAR DIL: CPT

## 2022-02-11 PROCEDURE — 87635 SARS-COV-2 COVID-19 AMP PRB: CPT

## 2022-02-11 PROCEDURE — 73610 X-RAY EXAM OF ANKLE: CPT

## 2022-02-11 PROCEDURE — 99285 EMERGENCY DEPT VISIT HI MDM: CPT

## 2022-02-11 PROCEDURE — 70450 CT HEAD/BRAIN W/O DYE: CPT

## 2022-02-11 PROCEDURE — 36415 COLL VENOUS BLD VENIPUNCTURE: CPT

## 2022-02-11 PROCEDURE — 82948 REAGENT STRIP/BLOOD GLUCOSE: CPT

## 2022-02-11 PROCEDURE — 87077 CULTURE AEROBIC IDENTIFY: CPT

## 2022-02-11 PROCEDURE — 83880 ASSAY OF NATRIURETIC PEPTIDE: CPT

## 2022-02-11 PROCEDURE — 87040 BLOOD CULTURE FOR BACTERIA: CPT

## 2022-02-11 PROCEDURE — 81001 URINALYSIS AUTO W/SCOPE: CPT

## 2022-02-11 PROCEDURE — 87086 URINE CULTURE/COLONY COUNT: CPT

## 2022-02-11 PROCEDURE — 93005 ELECTROCARDIOGRAM TRACING: CPT | Performed by: STUDENT IN AN ORGANIZED HEALTH CARE EDUCATION/TRAINING PROGRAM

## 2022-02-11 PROCEDURE — 83605 ASSAY OF LACTIC ACID: CPT

## 2022-02-11 PROCEDURE — 73564 X-RAY EXAM KNEE 4 OR MORE: CPT

## 2022-02-11 PROCEDURE — 85025 COMPLETE CBC W/AUTO DIFF WBC: CPT

## 2022-02-11 PROCEDURE — 73590 X-RAY EXAM OF LOWER LEG: CPT

## 2022-02-11 PROCEDURE — 80053 COMPREHEN METABOLIC PANEL: CPT

## 2022-02-11 PROCEDURE — 84484 ASSAY OF TROPONIN QUANT: CPT

## 2022-02-11 PROCEDURE — 71045 X-RAY EXAM CHEST 1 VIEW: CPT

## 2022-02-11 ASSESSMENT — PAIN DESCRIPTION - ORIENTATION: ORIENTATION: RIGHT

## 2022-02-11 ASSESSMENT — PAIN DESCRIPTION - LOCATION: LOCATION: LEG

## 2022-02-11 ASSESSMENT — PAIN DESCRIPTION - PAIN TYPE: TYPE: ACUTE PAIN

## 2022-02-12 ENCOUNTER — APPOINTMENT (OUTPATIENT)
Dept: INTERVENTIONAL RADIOLOGY/VASCULAR | Age: 87
DRG: 690 | End: 2022-02-12
Payer: MEDICARE

## 2022-02-12 PROBLEM — N39.0 UTI (URINARY TRACT INFECTION): Status: ACTIVE | Noted: 2022-02-12

## 2022-02-12 LAB
BASOPHILS # BLD: 0.4 %
BASOPHILS ABSOLUTE: 0.1 THOU/MM3 (ref 0–0.1)
EKG ATRIAL RATE: 88 BPM
EKG P AXIS: 62 DEGREES
EKG P-R INTERVAL: 150 MS
EKG Q-T INTERVAL: 402 MS
EKG QRS DURATION: 130 MS
EKG QTC CALCULATION (BAZETT): 486 MS
EKG R AXIS: -56 DEGREES
EKG T AXIS: 7 DEGREES
EKG VENTRICULAR RATE: 88 BPM
EOSINOPHIL # BLD: 0 %
EOSINOPHILS ABSOLUTE: 0 THOU/MM3 (ref 0–0.4)
ERYTHROCYTE [DISTWIDTH] IN BLOOD BY AUTOMATED COUNT: 14.8 % (ref 11.5–14.5)
ERYTHROCYTE [DISTWIDTH] IN BLOOD BY AUTOMATED COUNT: 49.5 FL (ref 35–45)
HCT VFR BLD CALC: 53.4 % (ref 37–47)
HEMOGLOBIN: 17.2 GM/DL (ref 12–16)
IMMATURE GRANS (ABS): 0.2 THOU/MM3 (ref 0–0.07)
IMMATURE GRANULOCYTES: 0.8 %
LACTIC ACID, SEPSIS: 1 MMOL/L (ref 0.5–1.9)
LYMPHOCYTES # BLD: 3.7 %
LYMPHOCYTES ABSOLUTE: 0.9 THOU/MM3 (ref 1–4.8)
MCH RBC QN AUTO: 30 PG (ref 26–33)
MCHC RBC AUTO-ENTMCNC: 32.2 GM/DL (ref 32.2–35.5)
MCV RBC AUTO: 93.2 FL (ref 81–99)
MONOCYTES # BLD: 5.6 %
MONOCYTES ABSOLUTE: 1.4 THOU/MM3 (ref 0.4–1.3)
NUCLEATED RED BLOOD CELLS: 0 /100 WBC
PLATELET # BLD: 586 THOU/MM3 (ref 130–400)
PLATELET ESTIMATE: ABNORMAL
PMV BLD AUTO: 12 FL (ref 9.4–12.4)
RBC # BLD: 5.73 MILL/MM3 (ref 4.2–5.4)
SCAN OF BLOOD SMEAR: NORMAL
SEG NEUTROPHILS: 89.5 %
SEGMENTED NEUTROPHILS ABSOLUTE COUNT: 22.2 THOU/MM3 (ref 1.8–7.7)
WBC # BLD: 24.8 THOU/MM3 (ref 4.8–10.8)

## 2022-02-12 PROCEDURE — 2580000003 HC RX 258: Performed by: STUDENT IN AN ORGANIZED HEALTH CARE EDUCATION/TRAINING PROGRAM

## 2022-02-12 PROCEDURE — 99223 1ST HOSP IP/OBS HIGH 75: CPT | Performed by: HOSPITALIST

## 2022-02-12 PROCEDURE — 36415 COLL VENOUS BLD VENIPUNCTURE: CPT

## 2022-02-12 PROCEDURE — 83605 ASSAY OF LACTIC ACID: CPT

## 2022-02-12 PROCEDURE — 6360000002 HC RX W HCPCS: Performed by: HOSPITALIST

## 2022-02-12 PROCEDURE — 2580000003 HC RX 258: Performed by: HOSPITALIST

## 2022-02-12 PROCEDURE — 6360000002 HC RX W HCPCS: Performed by: STUDENT IN AN ORGANIZED HEALTH CARE EDUCATION/TRAINING PROGRAM

## 2022-02-12 PROCEDURE — 1200000000 HC SEMI PRIVATE

## 2022-02-12 PROCEDURE — 96365 THER/PROPH/DIAG IV INF INIT: CPT

## 2022-02-12 PROCEDURE — 93880 EXTRACRANIAL BILAT STUDY: CPT

## 2022-02-12 PROCEDURE — 6370000000 HC RX 637 (ALT 250 FOR IP): Performed by: HOSPITALIST

## 2022-02-12 PROCEDURE — 93010 ELECTROCARDIOGRAM REPORT: CPT | Performed by: NUCLEAR MEDICINE

## 2022-02-12 RX ORDER — OMEGA-3-ACID ETHYL ESTERS 1 G/1
3000 CAPSULE, LIQUID FILLED ORAL DAILY
Status: DISCONTINUED | OUTPATIENT
Start: 2022-02-12 | End: 2022-02-14 | Stop reason: HOSPADM

## 2022-02-12 RX ORDER — PNV NO.95/FERROUS FUM/FOLIC AC 28MG-0.8MG
250 TABLET ORAL DAILY
Status: DISCONTINUED | OUTPATIENT
Start: 2022-02-12 | End: 2022-02-14 | Stop reason: HOSPADM

## 2022-02-12 RX ORDER — ACETAMINOPHEN 650 MG/1
650 SUPPOSITORY RECTAL EVERY 6 HOURS PRN
Status: DISCONTINUED | OUTPATIENT
Start: 2022-02-12 | End: 2022-02-14 | Stop reason: HOSPADM

## 2022-02-12 RX ORDER — ONDANSETRON 4 MG/1
4 TABLET, ORALLY DISINTEGRATING ORAL EVERY 8 HOURS PRN
Status: DISCONTINUED | OUTPATIENT
Start: 2022-02-12 | End: 2022-02-14 | Stop reason: HOSPADM

## 2022-02-12 RX ORDER — POLYETHYLENE GLYCOL 3350 17 G/17G
17 POWDER, FOR SOLUTION ORAL DAILY PRN
Status: DISCONTINUED | OUTPATIENT
Start: 2022-02-12 | End: 2022-02-14 | Stop reason: HOSPADM

## 2022-02-12 RX ORDER — ASPIRIN 81 MG/1
81 TABLET, CHEWABLE ORAL DAILY
Status: DISCONTINUED | OUTPATIENT
Start: 2022-02-12 | End: 2022-02-14 | Stop reason: HOSPADM

## 2022-02-12 RX ORDER — ONDANSETRON 2 MG/ML
4 INJECTION INTRAMUSCULAR; INTRAVENOUS EVERY 6 HOURS PRN
Status: DISCONTINUED | OUTPATIENT
Start: 2022-02-12 | End: 2022-02-14 | Stop reason: HOSPADM

## 2022-02-12 RX ORDER — SODIUM CHLORIDE 9 MG/ML
INJECTION, SOLUTION INTRAVENOUS CONTINUOUS
Status: DISCONTINUED | OUTPATIENT
Start: 2022-02-12 | End: 2022-02-12

## 2022-02-12 RX ORDER — ACETAMINOPHEN 325 MG/1
650 TABLET ORAL EVERY 6 HOURS PRN
Status: DISCONTINUED | OUTPATIENT
Start: 2022-02-12 | End: 2022-02-14 | Stop reason: HOSPADM

## 2022-02-12 RX ORDER — SODIUM CHLORIDE 0.9 % (FLUSH) 0.9 %
5-40 SYRINGE (ML) INJECTION PRN
Status: DISCONTINUED | OUTPATIENT
Start: 2022-02-12 | End: 2022-02-14 | Stop reason: HOSPADM

## 2022-02-12 RX ORDER — SODIUM CHLORIDE 9 MG/ML
25 INJECTION, SOLUTION INTRAVENOUS PRN
Status: DISCONTINUED | OUTPATIENT
Start: 2022-02-12 | End: 2022-02-14 | Stop reason: HOSPADM

## 2022-02-12 RX ORDER — SODIUM CHLORIDE 0.9 % (FLUSH) 0.9 %
5-40 SYRINGE (ML) INJECTION EVERY 12 HOURS SCHEDULED
Status: DISCONTINUED | OUTPATIENT
Start: 2022-02-12 | End: 2022-02-14 | Stop reason: HOSPADM

## 2022-02-12 RX ORDER — MULTIVITAMIN WITH IRON
1 TABLET ORAL DAILY
Status: DISCONTINUED | OUTPATIENT
Start: 2022-02-12 | End: 2022-02-14 | Stop reason: HOSPADM

## 2022-02-12 RX ORDER — AMLODIPINE BESYLATE 2.5 MG/1
2.5 TABLET ORAL 2 TIMES DAILY
Status: DISCONTINUED | OUTPATIENT
Start: 2022-02-12 | End: 2022-02-14 | Stop reason: HOSPADM

## 2022-02-12 RX ADMIN — OMEGA-3-ACID ETHYL ESTERS 3000 MG: 1 CAPSULE, LIQUID FILLED ORAL at 08:44

## 2022-02-12 RX ADMIN — SERTRALINE 50 MG: 50 TABLET, FILM COATED ORAL at 08:39

## 2022-02-12 RX ADMIN — AMLODIPINE BESYLATE 2.5 MG: 2.5 TABLET ORAL at 20:53

## 2022-02-12 RX ADMIN — SODIUM CHLORIDE: 9 INJECTION, SOLUTION INTRAVENOUS at 03:45

## 2022-02-12 RX ADMIN — AMLODIPINE BESYLATE 2.5 MG: 2.5 TABLET ORAL at 03:52

## 2022-02-12 RX ADMIN — SODIUM CHLORIDE, PRESERVATIVE FREE 10 ML: 5 INJECTION INTRAVENOUS at 20:58

## 2022-02-12 RX ADMIN — CEFTRIAXONE SODIUM 1000 MG: 1 INJECTION, POWDER, FOR SOLUTION INTRAMUSCULAR; INTRAVENOUS at 00:25

## 2022-02-12 RX ADMIN — Medication 250 MG: at 08:39

## 2022-02-12 RX ADMIN — ASPIRIN 81 MG 81 MG: 81 TABLET ORAL at 08:44

## 2022-02-12 RX ADMIN — Medication 1 TABLET: at 08:40

## 2022-02-12 RX ADMIN — AMLODIPINE BESYLATE 2.5 MG: 2.5 TABLET ORAL at 08:39

## 2022-02-12 RX ADMIN — ENOXAPARIN SODIUM 40 MG: 100 INJECTION SUBCUTANEOUS at 08:44

## 2022-02-12 ASSESSMENT — ENCOUNTER SYMPTOMS
RHINORRHEA: 0
NAUSEA: 0
BACK PAIN: 0
ABDOMINAL PAIN: 0
SHORTNESS OF BREATH: 0
DIARRHEA: 0
SORE THROAT: 0
CONSTIPATION: 0
EYE REDNESS: 0
VOMITING: 0

## 2022-02-12 ASSESSMENT — PAIN SCALES - GENERAL: PAINLEVEL_OUTOF10: 0

## 2022-02-12 NOTE — PLAN OF CARE
Patient admitted earlier this morning with weakness and falls concerning for possible recurrent urinary tract infection. She is on empiric Rocephin therapy. Urine cultures are pending. Previous urine culture results with E. coli sensitive to Rocephin.

## 2022-02-12 NOTE — ED TRIAGE NOTES
Pt to rm 01 per intake- family states pt seems to be weak and has fallen twice today-  states on one occasion they had to call EMS to hep get her up because she wasn't able to do it herself. Pt states she didn't have her shoes on and just got all tripped up. Denies hitting her head and she denies LOC. Daughter at bedside was concerned that her speech was slurred PTA but states it is normal now. Pt is very [place] and wears hearing aids- she is able to answer questions appropriately and follow commands. Speech clear. No obvious neuro deficits noted at this time. Denies any recent illnesses, no CP/SOB, states she is eating/drinking normally.

## 2022-02-12 NOTE — ED PROVIDER NOTES
[facility] ENCOUNTER          Pt Name: Lore Viramontes  MRN: [id number]  [place] 7/5/1933  Date of evaluation: 2/11/2022  Physician: Sherine Mcmahon MD    CHIEF COMPLAINT       Chief Complaint   Patient presents with    Extremity Weakness     History obtained from family at bedside and the patient. HISTORY OF PRESENT ILLNESS    HPI  Lore Viramontes is a 80 y.o. female with a history of possible prior CVA though patient denies this, hypertension who presents to the emergency department for evaluation of generalized weakness and falls. Patient reportedly had 2 falls today. The first fall she states that she tripped over the carpet. She denies hitting her head or loss of consciousness. She denies any chest pain with this fall. Patient was then reportedly her normal self and they were going out to dinner. As they were trying to get the patient in to the vehicle she had generalized weakness and was unable to get into the vehicle. As they were bringing her back in the house, patient stated that she felt very weak and fell as she was trying to go up the first step. Again, patient did not hit her head or have loss of consciousness. Patient reportedly seems somewhat confused today per family. Patient has history of recurrent UTIs and family states that she sometimes is like this when she has a UTI. Patient denies any pain including chest pain, shortness of breath, fever, abdominal pain, nausea/vomiting. Patient initially denied pain in her right lower extremity but then later stated that she had some minimal pain. Patient takes aspirin but is on no other anticoagulation. Patient typically ANO x4 at baseline and able to take care of most of her ADLs independently and uses a four-wheel walker to get around. The patient has no other acute complaints at this time.         REVIEW OF SYSTEMS   Review of Systems   Constitutional: Negative for chills and fever.   HENT: Negative for rhinorrhea and sore throat. Eyes: Negative for redness and visual disturbance. Respiratory: Negative for shortness of breath. Cardiovascular: Negative for chest pain. Gastrointestinal: Negative for abdominal pain, constipation, diarrhea, nausea and vomiting. Endocrine: Negative for polyuria. Genitourinary: Negative for dysuria and flank pain. Musculoskeletal: Negative for back pain and myalgias. Extremity pain   Skin: Negative for rash. Neurological: Positive for weakness (Generalized). Negative for syncope and headaches. Psychiatric/Behavioral: Positive for confusion (Intermittent). PAST MEDICAL AND SURGICAL HISTORY     Past Medical History:   Diagnosis Date    Blood circulation, collateral     lower legs    Cataract     Hypertension     Unspecified cerebral artery occlusion with cerebral infarction 5-2014     UTI (lower urinary tract infection)      Past Surgical History:   Procedure Laterality Date    BREAST BIOPSY      EYE SURGERY      bilateral cataracts    OTHER SURGICAL HISTORY  7/1/2015    ORIF LEFT PROXIMAL HUMERUS FRACTURE         MEDICATIONS   No current facility-administered medications for this encounter.     Current Outpatient Medications:     sertraline (ZOLOFT) 50 MG tablet, Take 50 mg by mouth daily, Disp: , Rfl:     Cinnamon 500 MG TABS, Take by mouth, Disp: , Rfl:     BACILLUS COAGULANS-INULIN PO, Take by mouth, Disp: , Rfl:     Ginkgo Biloba 120 MG TABS, Take by mouth, Disp: , Rfl:     Cranberry-Vitamin C 24222-993 MG CAPS, Take by mouth, Disp: , Rfl:     magnesium ([employer] MAGNESIUM) 250 MG TABS tablet, Take 250 mg by mouth daily, Disp: , Rfl:     Cholecalciferol (VITAMIN D3) 5000 units TABS, Take by mouth, Disp: , Rfl:     NONFORMULARY, , Disp: , Rfl:     NONFORMULARY, , Disp: , Rfl:     Black Pepper-Turmeric (TURMERIC CURCUMIN) 5-1000 MG CAPS, Take by mouth, Disp: , Rfl:     NONFORMULARY, , Disp: , Rfl:     D-MANNOSE PO, Take by mouth, Disp: , Rfl:     Omega-3 Fatty Acids (FISH OIL) 1000 MG CAPS, Take 3,000 mg by mouth daily Every other day is twice daily, Disp: , Rfl:     aspirin 81 MG chewable tablet, Take 1 tablet by mouth daily. , Disp: 30 tablet, Rfl: 2    amLODIPine (NORVASC) 2.5 MG tablet, Take 1 tablet by mouth 2 times daily. , Disp: 30 tablet, Rfl: 3    Multiple Vitamins-Minerals (THERAPEUTIC MULTIVITAMIN-MINERALS) tablet, Take 1 tablet by mouth daily. , Disp: , Rfl:       SOCIAL HISTORY     Social History     Social History Narrative    Not on file     Social History     Tobacco Use    Smoking status: Former Smoker     Years: 2.00     Quit date: 1/15/2017     Years since quittin.0    Smokeless tobacco: Never Used   Vaping Use    Vaping Use: Never used   Substance Use Topics    Alcohol use: Yes     Alcohol/week: 1.0 standard drink     Types: 1 Glasses of wine per week    Drug use: No         ALLERGIES   No Known Allergies      FAMILY HISTORY     Family History   Problem Relation Age of Onset    Stroke Father          PREVIOUS RECORDS   Previous records reviewed:   Discharge summary after falls related to UTI in . PHYSICAL EXAM     ED Triage Vitals [22]   BP Temp Temp Source Pulse Resp SpO2 Height Weight   (!) 163/86 98.5 °F (36.9 °C) Oral 90 17 94 % 5' 6\" (1.676 m) 183 lb (83 kg)     Initial vital signs and nursing assessment reviewed and normal. Body mass index is 29.54 kg/m². Pulsoximetry is Low normal range per my interpretation. Additional Vital Signs:  Vitals:    22 2345   BP: (!) 143/70   Pulse: 84   Resp:    Temp:    SpO2: 92%       Physical Exam  Vitals and nursing note reviewed. Constitutional:       General: She is not in acute distress. Appearance: Normal appearance. HENT:      Head: Normocephalic and atraumatic. Mouth/Throat:      Mouth: Mucous membranes are moist.   Eyes:      Extraocular Movements: Extraocular movements intact. Conjunctiva/sclera: Conjunctivae normal.      Pupils: Pupils are equal, round, and reactive to light. Neck:      Comments: No cervical spinal tenderness  Cardiovascular:      Rate and Rhythm: Normal rate and regular rhythm. Pulses: Normal pulses. Heart sounds: Normal heart sounds. Pulmonary:      Effort: Pulmonary effort is normal.      Breath sounds: Normal breath sounds. Abdominal:      General: Abdomen is flat. Palpations: Abdomen is soft. Tenderness: There is no abdominal tenderness. Musculoskeletal:         General: Normal range of motion. Cervical back: Normal range of motion and neck supple. Skin:     General: Skin is warm and dry. Capillary Refill: Capillary refill takes less than 2 seconds. Comments: Ecchymosis, swelling, localized tenderness to right knee/ankle, right lower leg diffusely. Tenderness to left medial malleolus. No pain with range of motion in bilateral lower extremities. No hip pain or tenderness. Neurological:      General: No focal deficit present. Mental Status: She is alert. Comments: Mild weakness in abduction of the left shoulder which is baseline per patient and her family due to prior shoulder surgery, otherwise no focal neurologic deficit with equal strength in all 4 extremities, normal sensation, no cranial nerve deficits. Patient oriented to herself and location but confused about the date. Psychiatric:         Mood and Affect: Mood normal.         Behavior: Behavior normal.             MEDICAL DECISION MAKING   Initial Assessment:   Kelley Larsen is a 80 y.o. female with a history of possible prior CVA though patient denies this, hypertension who presents to the emergency department for evaluation of generalized weakness and falls. Patient is hemodynamically stable and in no distress. Patient with no focal deficits at this time to warrant a stroke evaluation.   Differential diagnosis includes but is not limited to infection such as UTI or pneumonia, ACS, dehydration, intracranial hemorrhage, stroke, fracture, soft tissue injury. Plan:    CBC, CMP, troponin, BNP, UA, COVID   Chest x-ray, EKG   CT head without contrast   X-ray right knee, tib-fib, ankle, left ankle        ED RESULTS   Laboratory results:  Labs Reviewed   CBC WITH AUTO DIFFERENTIAL - Abnormal; Notable for the following components:       Result Value    WBC 24.8 (*)     RBC 5.73 (*)     Hemoglobin 17.2 (*)     Hematocrit 53.4 (*)     RDW-CV 14.8 (*)     RDW-SD 49.5 (*)     Platelets 894 (*)     All other components within normal limits   COMPREHENSIVE METABOLIC PANEL W/ REFLEX TO MG FOR LOW K - Abnormal; Notable for the following components:    Glucose 139 (*)     All other components within normal limits   ANION GAP - Abnormal; Notable for the following components:    Anion Gap 17.0 (*)     All other components within normal limits   GLOMERULAR FILTRATION RATE, ESTIMATED - Abnormal; Notable for the following components:    Est, Glom Filt Rate 68 (*)     All other components within normal limits   URINE WITH REFLEXED MICRO - Abnormal; Notable for the following components:    Ketones, Urine TRACE (*)     Protein, UA 30 (*)     Nitrite, Urine POSITIVE (*)     Leukocyte Esterase, Urine SMALL (*)     Color, UA DK YELLOW (*)     Character, Urine CLOUDY (*)     All other components within normal limits   POCT GLUCOSE - Abnormal; Notable for the following components:    POC Glucose 150 (*)     All other components within normal limits   POCT GLUCOSE - Normal   COVID-19, RAPID   CULTURE, BLOOD 1   CULTURE, BLOOD 2   CULTURE, REFLEXED, URINE    Narrative:     Source: urine       Site: catheter -straight cath          Current Antibiotics: not stated   TROPONIN   BRAIN NATRIURETIC PEPTIDE   LACTATE, SEPSIS   LACTATE, SEPSIS   OSMOLALITY       Radiologic studies results:  CT Head WO Contrast   Final Result   No acute intracranial findings.    Nonemergent/incidental findings in the report. This document has been electronically signed by: Laurie Hogue MD on    02/11/2022 11:00 PM      All CTs at this facility use dose modulation techniques and iterative    reconstructions, and/or weight-based dosing   when appropriate to reduce radiation to a low as reasonably achievable. XR CHEST PORTABLE   Final Result   No acute findings. Nonemergent/incidental findings in the report. This document has been electronically signed by: Laurie Hogue MD on    02/11/2022 10:53 PM      XR ANKLE RIGHT (MIN 3 VIEWS)   Final Result   No acute fracture. Soft tissue swelling. Nonemergent/incidental findings in the report. This document has been electronically signed by: Laurie Hogue MD on    02/11/2022 10:46 PM      XR ANKLE LEFT (MIN 3 VIEWS)   Final Result   No acute fracture. Soft tissue swelling. Nonemergent/incidental findings in the report. This document has been electronically signed by: Laurie Hogue MD on    02/11/2022 10:44 PM      XR TIBIA FIBULA RIGHT (2 VIEWS)   Final Result   No acute fracture. Mild soft tissue swelling. Nonemergent/incidental findings in the report. This document has been electronically signed by: Laurie Hogue MD on    02/11/2022 10:51 PM      XR KNEE RIGHT (MIN 4 VIEWS)   Final Result   No acute findings. Nonemergent/incidental findings in the report. This document has been electronically signed by: Laurie Hogue MD on    02/11/2022 10:48 PM                ED COURSE   ED Medications administered this visit:   Medications   cefTRIAXone (ROCEPHIN) 1000 mg IVPB in 50 mL D5W minibag (0 mg IntraVENous Stopped 2/12/22 0103)     Laboratory work-up shows leukocytosis of 24.8 indicating higher suspicion for infection. Urine reportedly cloudy and malodorous on straight cath. Urine with positive nitrites, 10-15 WBCs, small leukocyte esterase, and moderate bacteria concerning for UTI as etiology of patient's infection.   Patient started on ceftriaxone. Urine culture, blood cultures, lactate obtained and sent. Other work-up unremarkable including negative troponin, negative Covid, negative chest x-ray, normal lactate. X-rays of lower extremities negative for acute fracture and instead only show soft tissue swelling. Given UTI with significant leukocytosis, intermittent mental status changes, and multiple falls at home due to generalized weakness, plan to admit patient to hospitalist team for IV antibiotics and further management for UTI. Patient and family updated and agreeable with this plan. Patient to be admitted to the hospitalist team.      MEDICATION CHANGES     New Prescriptions    No medications on file         FINAL DISPOSITION     Final diagnoses:   Urinary tract infection without hematuria, site unspecified   Multiple falls     Condition: condition: stable  Dispo: Admit to med/surg floor      This transcription was electronically signed. It was dictated by use of voice recognition software and electronically transcribed. The transcription may contain errors not detected in proofreading.         Pako Estrada MD  02/12/22 9960

## 2022-02-12 NOTE — H&P
Department of Internal Medicine  General Internal Medicine  Attending History and Physical      CHIEF COMPLAINT: UTI    Reason for Admission: UTI    History Obtained From:  patient    HISTORY OF PRESENT ILLNESS:      The patient is a 80 y.o. female with significant past medical history of HTN, CVA and CAD is admitted for generalized weakness due to UTI. She is a poor historian. She has generalized weakness that started today. She also fell twice due to weakness. The second fall occurred when she tripped for having no shoes. No LOC. No other complaint. Past Medical History:        Diagnosis Date    Blood circulation, collateral     lower legs    Cataract     Hypertension     Unspecified cerebral artery occlusion with cerebral infarction 5-2014     UTI (lower urinary tract infection)      Past Surgical History:        Procedure Laterality Date    BREAST BIOPSY      EYE SURGERY      bilateral cataracts    OTHER SURGICAL HISTORY  7/1/2015    ORIF LEFT PROXIMAL HUMERUS FRACTURE     Medications Prior to Admission:    Not in a hospital admission. Allergies:  Patient has no known allergies. Family History:       Problem Relation Age of Onset    Stroke Father      REVIEW OF SYSTEMS:  10/10 systems reviewed and are negative. PHYSICAL EXAM:  Vitals:  BP (!) 143/70   Pulse 84   Temp 98.5 °F (36.9 °C) (Oral)   Resp 17   Ht 5' 6\" (1.676 m)   Wt 183 lb (83 kg)   SpO2 92%   BMI 29.54 kg/m²   General appearance: NAD  HEENT: Pupils equal, round, and reactive to light. Conjunctivae/corneas clear. Dry oral mucosa  Neck: Supple, with full range of motion. No jugular venous distention. Trachea midline. Respiratory:  CTAB  Cardiovascular: RRR, nl S1 and S2  Abdomen: Soft, non-tender, non-distended with normal bowel sounds. Musculoskeletal: passive and active ROM x 4 extremities. Skin: Skin color, texture, turgor normal.  No rashes or lesions.   Neurologic:  Neurovascularly intact without any focal sensory/motor deficits. Cranial nerves: II-XII intact, grossly non-focal.  Psychiatric: Alert and oriented, thought content appropriate, normal insight  Capillary Refill: Brisk,< 3 seconds   Peripheral Pulses: +2 palpable, equal bilaterally     DATA:  CBC with Differential:    Lab Results   Component Value Date    WBC 24.8 02/11/2022    RBC 5.73 02/11/2022    RBC 5.13 12/03/2021    HGB 17.2 02/11/2022    HCT 53.4 02/11/2022     02/11/2022    MCV 93.2 02/11/2022    MCH 30.0 02/11/2022    MCHC 32.2 02/11/2022    RDW 14.6 12/03/2021    NRBC 0 08/16/2020    SEGSPCT 78.6 08/16/2020    LYMPHOPCT 13.3 12/03/2021    MONOPCT 6.6 12/03/2021    EOSPCT 1.8 12/03/2021    BASOPCT 0.9 12/03/2021    MONOSABS 0.9 12/03/2021    MONOSABS 1.4 08/16/2020    LYMPHSABS 1.9 12/03/2021    LYMPHSABS 1.6 08/16/2020    EOSABS 0.3 12/03/2021    EOSABS 0.1 08/16/2020    BASOSABS 0.1 12/03/2021    BASOSABS 0.1 08/16/2020     CMP:    Lab Results   Component Value Date     02/11/2022    K 4.3 02/11/2022     02/11/2022    CO2 23 02/11/2022    BUN 19 02/11/2022    CREATININE 0.8 02/11/2022    LABGLOM 68 02/11/2022    GLUCOSE 139 02/11/2022    GLUCOSE 88 12/03/2021    PROT 8.0 02/11/2022    LABALBU 4.7 02/11/2022    CALCIUM 9.8 02/11/2022    BILITOT 0.7 02/11/2022    ALKPHOS 119 02/11/2022    AST 39 02/11/2022    ALT 26 02/11/2022     ASSESSMENT AND PLAN:    80 y.o. female with significant past medical history of HTN, CVA and CAD is admitted for generalized weakness due to UTI. 1) Generalized weakness due to UTI. UA disclosed WBC, moderate bacteria and small esterase  WBC 24  Will start ceftriaxone. Cultures pending. 2) Fall  CT head is negative  EKG is unremarkable. Xray of ankle disclosed no fracture. PT/OT. 3) Chronic issues   HTN, CVA and CAD  Resume home meds.

## 2022-02-12 NOTE — ED NOTES
ED to inpatient nurses report    Chief Complaint   Patient presents with    Extremity Weakness      Present to ED from home  LOC: alert and orientated to name, place, date  Vital signs   Vitals:    02/11/22 2115 02/11/22 2122 02/11/22 2203 02/11/22 2345   BP: (!) 163/86  (!) 156/61 (!) 143/70   Pulse: 90 88 87 84   Resp: 17  17    Temp: 98.5 °F (36.9 °C)      TempSrc: Oral      SpO2: 94%  93% 92%   Weight: 183 lb (83 kg)      Height: 5' 6\" (1.676 m)         Oxygen Baseline RA    Current needs required  Bipap/Cpap No  LDAs:   Peripheral IV 02/11/22 Right Antecubital (Active)   Site Assessment Clean;Dry; Intact 02/11/22 2121   Line Status Flushed 02/11/22 2121   Dressing Status Clean;Dry; Intact 02/11/22 2121   Dressing Intervention New 02/11/22 2121     Mobility: Requires assistance * 1  Pending ED orders:  None  Present condition: Restful on cart w/family at bedside. [place].  Denies current needs or concerns    Electronically signed by Chito Sharp RN on 2/12/2022 at 1400 [facility], RN  02/12/22 1594

## 2022-02-12 NOTE — PROGRESS NOTES
Pt admitted to  6K15   IV site free of s/s of infection or infiltration. Vital signs obtained. Two nurse skin assessment performed by ifeanyi RN and christiano RN. Pt found to have excoriation on her abdomen and blanchable redness on her coccyx. Oriented to room. Policies and procedures for 6K explained. This RN discussed hourly rounding with patient addressing 5 P's. Fall prevention and safety brochure discussed with patient. Bed alarm on. Call light in reach. No

## 2022-02-12 NOTE — ED NOTES
Lab at bedside for draw. Pt continues restful on cart talking w/family.  VSS     Braulio Whyte RN  02/11/22 8119

## 2022-02-13 ENCOUNTER — APPOINTMENT (OUTPATIENT)
Dept: CT IMAGING | Age: 87
DRG: 690 | End: 2022-02-13
Payer: MEDICARE

## 2022-02-13 LAB
ALBUMIN SERPL-MCNC: 3.4 G/DL (ref 3.5–5.1)
ALP BLD-CCNC: 83 U/L (ref 38–126)
ALT SERPL-CCNC: 20 U/L (ref 11–66)
ANION GAP SERPL CALCULATED.3IONS-SCNC: 10 MEQ/L (ref 8–16)
AST SERPL-CCNC: 54 U/L (ref 5–40)
BASOPHILS # BLD: 0.7 %
BASOPHILS ABSOLUTE: 0.1 THOU/MM3 (ref 0–0.1)
BILIRUB SERPL-MCNC: 0.5 MG/DL (ref 0.3–1.2)
BUN BLDV-MCNC: 19 MG/DL (ref 7–22)
CALCIUM SERPL-MCNC: 8.5 MG/DL (ref 8.5–10.5)
CHLORIDE BLD-SCNC: 107 MEQ/L (ref 98–111)
CO2: 20 MEQ/L (ref 23–33)
CREAT SERPL-MCNC: 0.6 MG/DL (ref 0.4–1.2)
EOSINOPHIL # BLD: 0.7 %
EOSINOPHILS ABSOLUTE: 0.1 THOU/MM3 (ref 0–0.4)
ERYTHROCYTE [DISTWIDTH] IN BLOOD BY AUTOMATED COUNT: 14.6 % (ref 11.5–14.5)
ERYTHROCYTE [DISTWIDTH] IN BLOOD BY AUTOMATED COUNT: 52.8 FL (ref 35–45)
GFR SERPL CREATININE-BSD FRML MDRD: > 90 ML/MIN/1.73M2
GLUCOSE BLD-MCNC: 97 MG/DL (ref 70–108)
HCT VFR BLD CALC: 47.1 % (ref 37–47)
HEMOGLOBIN: 14.5 GM/DL (ref 12–16)
IMMATURE GRANS (ABS): 0.08 THOU/MM3 (ref 0–0.07)
IMMATURE GRANULOCYTES: 0.5 %
LYMPHOCYTES # BLD: 13.6 %
LYMPHOCYTES ABSOLUTE: 2 THOU/MM3 (ref 1–4.8)
MCH RBC QN AUTO: 30.1 PG (ref 26–33)
MCHC RBC AUTO-ENTMCNC: 30.8 GM/DL (ref 32.2–35.5)
MCV RBC AUTO: 97.7 FL (ref 81–99)
MONOCYTES # BLD: 8.3 %
MONOCYTES ABSOLUTE: 1.2 THOU/MM3 (ref 0.4–1.3)
NUCLEATED RED BLOOD CELLS: 0 /100 WBC
PLATELET # BLD: 479 THOU/MM3 (ref 130–400)
PMV BLD AUTO: 11.9 FL (ref 9.4–12.4)
POTASSIUM REFLEX MAGNESIUM: 4.2 MEQ/L (ref 3.5–5.2)
RBC # BLD: 4.82 MILL/MM3 (ref 4.2–5.4)
SEG NEUTROPHILS: 76.2 %
SEGMENTED NEUTROPHILS ABSOLUTE COUNT: 11.3 THOU/MM3 (ref 1.8–7.7)
SODIUM BLD-SCNC: 137 MEQ/L (ref 135–145)
TOTAL PROTEIN: 6 G/DL (ref 6.1–8)
WBC # BLD: 14.8 THOU/MM3 (ref 4.8–10.8)

## 2022-02-13 PROCEDURE — 70498 CT ANGIOGRAPHY NECK: CPT

## 2022-02-13 PROCEDURE — 6360000002 HC RX W HCPCS: Performed by: HOSPITALIST

## 2022-02-13 PROCEDURE — 80053 COMPREHEN METABOLIC PANEL: CPT

## 2022-02-13 PROCEDURE — 6360000004 HC RX CONTRAST MEDICATION: Performed by: STUDENT IN AN ORGANIZED HEALTH CARE EDUCATION/TRAINING PROGRAM

## 2022-02-13 PROCEDURE — 85025 COMPLETE CBC W/AUTO DIFF WBC: CPT

## 2022-02-13 PROCEDURE — 4A03X5D MEASUREMENT OF ARTERIAL FLOW, INTRACRANIAL, EXTERNAL APPROACH: ICD-10-PCS | Performed by: RADIOLOGY

## 2022-02-13 PROCEDURE — 36415 COLL VENOUS BLD VENIPUNCTURE: CPT

## 2022-02-13 PROCEDURE — 99232 SBSQ HOSP IP/OBS MODERATE 35: CPT | Performed by: STUDENT IN AN ORGANIZED HEALTH CARE EDUCATION/TRAINING PROGRAM

## 2022-02-13 PROCEDURE — 1200000000 HC SEMI PRIVATE

## 2022-02-13 PROCEDURE — 70496 CT ANGIOGRAPHY HEAD: CPT

## 2022-02-13 PROCEDURE — 6370000000 HC RX 637 (ALT 250 FOR IP): Performed by: HOSPITALIST

## 2022-02-13 PROCEDURE — 2580000003 HC RX 258: Performed by: HOSPITALIST

## 2022-02-13 RX ADMIN — IOPAMIDOL 80 ML: 755 INJECTION, SOLUTION INTRAVENOUS at 13:39

## 2022-02-13 RX ADMIN — OMEGA-3-ACID ETHYL ESTERS 3000 MG: 1 CAPSULE, LIQUID FILLED ORAL at 08:04

## 2022-02-13 RX ADMIN — CEFTRIAXONE SODIUM 1000 MG: 1 INJECTION, POWDER, FOR SOLUTION INTRAMUSCULAR; INTRAVENOUS at 23:50

## 2022-02-13 RX ADMIN — SODIUM CHLORIDE, PRESERVATIVE FREE 10 ML: 5 INJECTION INTRAVENOUS at 21:01

## 2022-02-13 RX ADMIN — Medication 250 MG: at 15:58

## 2022-02-13 RX ADMIN — ASPIRIN 81 MG 81 MG: 81 TABLET ORAL at 08:04

## 2022-02-13 RX ADMIN — AMLODIPINE BESYLATE 2.5 MG: 2.5 TABLET ORAL at 08:05

## 2022-02-13 RX ADMIN — SODIUM CHLORIDE, PRESERVATIVE FREE 10 ML: 5 INJECTION INTRAVENOUS at 08:06

## 2022-02-13 RX ADMIN — CEFTRIAXONE SODIUM 1000 MG: 1 INJECTION, POWDER, FOR SOLUTION INTRAMUSCULAR; INTRAVENOUS at 00:59

## 2022-02-13 RX ADMIN — Medication 1 TABLET: at 08:05

## 2022-02-13 RX ADMIN — ENOXAPARIN SODIUM 40 MG: 100 INJECTION SUBCUTANEOUS at 08:04

## 2022-02-13 RX ADMIN — AMLODIPINE BESYLATE 2.5 MG: 2.5 TABLET ORAL at 21:01

## 2022-02-13 RX ADMIN — SERTRALINE 50 MG: 50 TABLET, FILM COATED ORAL at 08:05

## 2022-02-13 ASSESSMENT — PAIN SCALES - GENERAL: PAINLEVEL_OUTOF10: 0

## 2022-02-13 NOTE — PROGRESS NOTES
Hospitalist Progress Note      [facility]    Unit/Bed:6K-15/015-A  YOB: 1933  MRN: [id number]   Acct: [de-identified]   PCP: Penny Wilson MD  Date of Admission: 2/11/2022    Assessment/Plan:    1. Suspected urinary tract infection:  a. On empiric Rocephin. Urine culture pending.  b. Patient afebrile. 2. Ground-level fall at home:   a. Unclear etiology. b. Carotid duplex unable to visualize the left carotid artery. Will pursue CT angiogram of the head and neck to further evaluate. Concern for possible subclavian steal syndrome. c. PT/OT evaluations. 3. Hypertension:   a. Continue amlodipine  4. History of prior CVA:   a. On aspirin  5. Coronary artery disease:  a. On aspirin  6. Thyroid nodules:   a. Incidental finding on CT scan. Recommend outpatient ultrasound to further evaluate. Disposition: Likely home in 24 to 48 hours    Chief Complaint: Weakness, fall    Hospital Course:    2/12: Patient was the hospital with complaints of weakness and a fall which seem to be related to the weakness. No prodromal symptoms. She is found to have a likely her tract infection. She has a fairly extensive history of urinary tract infections, typically with E. coli. She was started on empiric Rocephin therapy. Subjective (past 24 hours):   Patient doing well. Denies any dysuria or abdominal pain. Denies any fevers or chills. Denies headache, lightheadedness, or dizziness. Nursing reports no acute events overnight. Past medical history, family history, social history and allergies reviewed again and is unchanged since admission. ROS (12 point review of systems completed. Pertinent positives noted.  Otherwise ROS is negative)     Medications:  Reviewed    Infusion Medications    sodium chloride       Scheduled Medications    multivitamin  1 tablet Oral Daily    aspirin  81 mg Oral Daily    amLODIPine  2.5 mg Oral BID  omega-3 acid ethyl esters  3,000 mg Oral Daily    sertraline  50 mg Oral Daily    magnesium oxide  250 mg Oral Daily    sodium chloride flush  5-40 mL IntraVENous 2 times per day    enoxaparin  40 mg SubCUTAneous Daily    cefTRIAXone (ROCEPHIN) IV  1,000 mg IntraVENous Q24H     PRN Meds: sodium chloride flush, sodium chloride, ondansetron **OR** ondansetron, acetaminophen **OR** acetaminophen, polyethylene glycol      Intake/Output Summary (Last 24 hours) at 2/13/2022 1444  Last data filed at 2/13/2022 1100  Gross per 24 hour   Intake 120 ml   Output 0 ml   Net 120 ml       Diet:  ADULT DIET; Regular    Exam:  /60   Pulse 70   Temp 98 °F (36.7 °C) (Oral)   Resp 16   Ht 5' 6\" (1.676 m)   Wt 189 lb 13.1 oz (86.1 kg)   SpO2 94%   BMI 30.64 kg/m²   General appearance: Elderly female resting in bed. No acute distress. HEENT: Pupils equal, round, and reactive to light. Conjunctivae/corneas clear. Neck: Supple, with full range of motion. No jugular venous distention. Trachea midline. Respiratory:  Normal respiratory effort. Clear to auscultation, bilaterally without Rales/Wheezes/Rhonchi. Cardiovascular: Regular rate and rhythm. Normal S1-S2. Soft systolic ejection murmur. No carotid bruits auscultated. Abdomen: Soft, non-tender, non-distended with normal bowel sounds. Musculoskeletal: passive and active ROM x 4 extremities. Skin: Skin color, texture, turgor normal.  No rashes or lesions. Neurologic:  Neurovascularly intact without any focal sensory/motor deficits. Cranial nerves: II-XII intact, grossly non-focal.  Hard of hearing. Uses hearing aids.   Psychiatric: Alert and oriented, thought content appropriate, normal insight  Capillary Refill: Brisk,< 3 seconds   Peripheral Pulses: +2 palpable, equal bilaterally     Labs:   Recent Labs     02/11/22 2130 02/13/22  0354   WBC 24.8* 14.8*   HGB 17.2* 14.5   HCT 53.4* 47.1*   * 479*     Recent Labs     02/11/22 2130 02/13/22  0354  137   K 4.3 4.2    107   CO2 23 20*   BUN 19 19   CREATININE 0.8 0.6   CALCIUM 9.8 8.5     Recent Labs     02/11/22  2130 02/13/22  0354   AST 39 54*   ALT 26 20   BILITOT 0.7 0.5   ALKPHOS 119 83     No results for input(s): INR in the last 72 hours. No results for input(s): Meldon Montez in the last 72 hours. Microbiology:    Blood culture #1:   Lab Results   Component Value Date    BC No growth-preliminary  02/11/2022       Blood culture #2:No results found for: Teresa Colon    Organism:  Lab Results   Component Value Date    ORG Aerococcus urinae 08/16/2020       No results found for: LABGRAM    MRSA culture only:No results found for: [facility]    Urine culture:   Lab Results   Component Value Date    [facility]  08/16/2020     At least one of drugs in current antibiotic therapy may be ineffective in vitro for isolate. [facility]  08/16/2020     Cord count: 50,000-90,000 CFU/mL A. urinae is generally susceptible to ampicillin, penicillin, amoxicillin, beta lactams, cephalosporins and doxycycline. This organism has variable susceptibility to levofloxacin,vancomycin and trimethoprim-sulfamethoxazole. Respiratory culture: No results found for: CULTRESP    Aerobic and Anaerobic :  No results found for: LABAERO  No results found for: LABANAE    Urinalysis:      Lab Results   Component Value Date    NITRU POSITIVE 02/11/2022    WBCUA 10-15 02/11/2022    BACTERIA MODERATE 02/11/2022    RBCUA NONE 02/11/2022    BLOODU NEGATIVE 02/11/2022    GLUCOSEU NEGATIVE 02/11/2022       Radiology:  VL DUP CAROTID BILATERAL   Final Result   1. The left internal carotid artery could not be identified on this exam. A CT angiography of the head and neck is recommended for further evaluation. 2. Evaluation of the right extracranial internal carotid artery is unremarkable. 3. The right vertebral artery waveforms suggest a right subclavian artery pre-steal phenomenon.    4. A 4.2 x 3 x 3 cm isoechoic left thyroid nodule is seen. Dedicated ultrasound of the thyroid gland should be obtained. 5. Elevated peak systolic velocity in the proximal right common carotid artery at 190.3 cm/s is likely due to the tortuosity of the vessel rather than stenosis. **The degree of stenosis analysis was made using the Society of Radiologists in Ultrasound consensus criteria for carotid artery stenosis      **This report has been created using voice recognition software. It may contain minor errors which are inherent in voice recognition technology. **      Final report electronically signed by Dr Irena Porter on 2/12/2022 10:02 PM      CT Head WO Contrast   Final Result   No acute intracranial findings. Nonemergent/incidental findings in the report. This document has been electronically signed by: Mark Odonnell MD on    02/11/2022 11:00 PM      All CTs at this facility use dose modulation techniques and iterative    reconstructions, and/or weight-based dosing   when appropriate to reduce radiation to a low as reasonably achievable. XR CHEST PORTABLE   Final Result   No acute findings. Nonemergent/incidental findings in the report. This document has been electronically signed by: Mark Odonnell MD on    02/11/2022 10:53 PM      XR ANKLE RIGHT (MIN 3 VIEWS)   Final Result   No acute fracture. Soft tissue swelling. Nonemergent/incidental findings in the report. This document has been electronically signed by: Mark Odonnell MD on    02/11/2022 10:46 PM      XR ANKLE LEFT (MIN 3 VIEWS)   Final Result   No acute fracture. Soft tissue swelling. Nonemergent/incidental findings in the report. This document has been electronically signed by: Mark Odonnell MD on    02/11/2022 10:44 PM      XR TIBIA FIBULA RIGHT (2 VIEWS)   Final Result   No acute fracture. Mild soft tissue swelling. Nonemergent/incidental findings in the report.       This document has been electronically signed by: Mark Odonnell MD on 02/11/2022 10:51 PM      XR KNEE RIGHT (MIN 4 VIEWS)   Final Result   No acute findings. Nonemergent/incidental findings in the report. This document has been electronically signed by: Gia Ortiz MD on    02/11/2022 10:48 PM      CTA HEAD W [address]    (Results Pending)   CTA NECK W WO CONTRAST    (Results Pending)     XR KNEE RIGHT (MIN 4 VIEWS)    Result Date: 2/11/2022  Right knee, 4 views COMPARISON: None FINDINGS: No acute fracture. No dislocation. No visible effusion. Mild degenerative bony spurring in all compartments. Medial and lateral meniscal chondrocalcinosis. No acute findings. Nonemergent/incidental findings in the report. This document has been electronically signed by: Gia Ortiz MD on 02/11/2022 10:48 PM    XR TIBIA FIBULA RIGHT (2 VIEWS)    Result Date: 2/11/2022  Right tibia/fibula, 2 views, 4 images COMPARISON: None FINDINGS: No acute fracture. No dislocation. Degenerative changes in the right knee. Small plantar calcaneal spur. Mild soft tissue swelling. No acute fracture. Mild soft tissue swelling. Nonemergent/incidental findings in the report. This document has been electronically signed by: Gia Ortiz MD on 02/11/2022 10:51 PM    XR ANKLE LEFT (MIN 3 VIEWS)    Result Date: 2/11/2022  Left ankle, 3 views COMPARISON: None FINDINGS: No acute fracture. No dislocation. Osteopenia. Small plantar calcaneal spur. Diffuse soft tissue swelling. No acute fracture. Soft tissue swelling. Nonemergent/incidental findings in the report. This document has been electronically signed by: Gia Ortiz MD on 02/11/2022 10:44 PM    XR ANKLE RIGHT (MIN 3 VIEWS)    Result Date: 2/11/2022  Right ankle, 3 views COMPARISON: None FINDINGS: No acute fracture. No dislocation. Osteopenia. Small plantar calcaneal spur. Mild diffuse soft tissue swelling. No acute fracture. Soft tissue swelling. Nonemergent/incidental findings in the report.  This document has been electronically signed by: Sangita Junior MD on 02/11/2022 10:46 PM    CT Head WO Contrast    Result Date: 2/11/2022  CT Head WO Contrast COMPARISON: None FINDINGS: No acute intracranial hemorrhage. No evidence of acute infarction. Diffuse cortical volume loss. Nonspecific white matter hypodensities most commonly associated with chronic microangiopathic changes. No mass-effect or midline shift. No hydrocephalus. Visualized orbits are normal. Clear paranasal sinuses. Clear mastoid air cells. No acute fracture. Unremarkable soft tissues. No acute intracranial findings. Nonemergent/incidental findings in the report. This document has been electronically signed by: Sangita Junior MD on 02/11/2022 11:00 PM All CTs at this facility use dose modulation techniques and iterative reconstructions, and/or weight-based dosing when appropriate to reduce radiation to a low as reasonably achievable. XR CHEST PORTABLE    Result Date: 2/11/2022  Chest X-ray, 1 View COMPARISON: None FINDINGS: No consolidation. Mild bibasilar atelectasis. No pleural effusion. No pneumothorax. Mild cardiomegaly. No acute fracture. Orthopedic hardware in the left humerus. No acute findings. Nonemergent/incidental findings in the report. This document has been electronically signed by: Sangita Junior MD on 02/11/2022 10:53 PM    VL DUP CAROTID BILATERAL    Result Date: 2/12/2022  PROCEDURE: VL DUP CAROTID BILATERAL CLINICAL INFORMATION: possible CVA COMPARISON: None TECHNIQUE: Grayscale and color Doppler sonographic imaging of the extracranial carotid arteries performed in longitudinal and transverse planes. FINDINGS: The peak systolic velocity in the right and left distal common carotid arteries are 74.6 and 74.2 cm/s respectively. The peak systolic velocity in the right internal carotid artery is 82.7 cm/s. The left internal carotid artery could not be identified. The ICA to CCA ratio on the right right is 1.1.  The peak systolic velocities in the right and left external carotid arteries are 76.5 and 72 cm/s respectively. The peak systolic velocities in the right and left vertebral arteries are 35.4 and 34.7 cm/s respectively. Antegrade flow is seen in the vertebral arteries. A midsystolic depression in the waveform of the right vertebral artery is noted that can suggest a right subclavian presteal phenomenon. Incidental note is made of a 4.2 x 3 x 3 cm isoechoic nodule in the mid left thyroid lobe (TR 3)     1. The left internal carotid artery could not be identified on this exam. A CT angiography of the head and neck is recommended for further evaluation. 2. Evaluation of the right extracranial internal carotid artery is unremarkable. 3. The right vertebral artery waveforms suggest a right subclavian artery pre-steal phenomenon. 4. A 4.2 x 3 x 3 cm isoechoic left thyroid nodule is seen. Dedicated ultrasound of the thyroid gland should be obtained. 5. Elevated peak systolic velocity in the proximal right common carotid artery at 190.3 cm/s is likely due to the tortuosity of the vessel rather than stenosis. **The degree of stenosis analysis was made using the Society of Radiologists in Ultrasound consensus criteria for carotid artery stenosis **This report has been created using voice recognition software. It may contain minor errors which are inherent in voice recognition technology. ** Final report electronically signed by Dr Bonnie Varma on 2/12/2022 10:02 PM      Electronically signed by Sean Stinson DO on 2/13/2022 at 2:44 PM

## 2022-02-13 NOTE — PLAN OF CARE
Patient's blood pressure was high this morning; on recheck after administration of morning medications, patient's blood pressure returned to acceptable level for patient. Bed alarm is in place. Will continue to do purposeful rounding to ensure safety.

## 2022-02-14 VITALS
DIASTOLIC BLOOD PRESSURE: 72 MMHG | SYSTOLIC BLOOD PRESSURE: 140 MMHG | RESPIRATION RATE: 18 BRPM | TEMPERATURE: 99.7 F | BODY MASS INDEX: 30.51 KG/M2 | HEART RATE: 62 BPM | WEIGHT: 189.82 LBS | OXYGEN SATURATION: 95 % | HEIGHT: 66 IN

## 2022-02-14 PROCEDURE — 2580000003 HC RX 258: Performed by: HOSPITALIST

## 2022-02-14 PROCEDURE — 97116 GAIT TRAINING THERAPY: CPT

## 2022-02-14 PROCEDURE — 97535 SELF CARE MNGMENT TRAINING: CPT

## 2022-02-14 PROCEDURE — 97162 PT EVAL MOD COMPLEX 30 MIN: CPT

## 2022-02-14 PROCEDURE — 6360000002 HC RX W HCPCS: Performed by: HOSPITALIST

## 2022-02-14 PROCEDURE — 97165 OT EVAL LOW COMPLEX 30 MIN: CPT

## 2022-02-14 PROCEDURE — 99239 HOSP IP/OBS DSCHRG MGMT >30: CPT | Performed by: STUDENT IN AN ORGANIZED HEALTH CARE EDUCATION/TRAINING PROGRAM

## 2022-02-14 PROCEDURE — 6370000000 HC RX 637 (ALT 250 FOR IP): Performed by: HOSPITALIST

## 2022-02-14 RX ORDER — NITROFURANTOIN 25; 75 MG/1; MG/1
100 CAPSULE ORAL 2 TIMES DAILY
Qty: 10 CAPSULE | Refills: 0 | Status: SHIPPED | OUTPATIENT
Start: 2022-02-14 | End: 2022-02-19

## 2022-02-14 RX ADMIN — ASPIRIN 81 MG 81 MG: 81 TABLET ORAL at 07:56

## 2022-02-14 RX ADMIN — Medication 250 MG: at 07:57

## 2022-02-14 RX ADMIN — SODIUM CHLORIDE, PRESERVATIVE FREE 10 ML: 5 INJECTION INTRAVENOUS at 07:58

## 2022-02-14 RX ADMIN — SERTRALINE 50 MG: 50 TABLET, FILM COATED ORAL at 07:57

## 2022-02-14 RX ADMIN — ENOXAPARIN SODIUM 40 MG: 100 INJECTION SUBCUTANEOUS at 07:57

## 2022-02-14 RX ADMIN — OMEGA-3-ACID ETHYL ESTERS 3000 MG: 1 CAPSULE, LIQUID FILLED ORAL at 07:56

## 2022-02-14 RX ADMIN — Medication 1 TABLET: at 07:57

## 2022-02-14 RX ADMIN — AMLODIPINE BESYLATE 2.5 MG: 2.5 TABLET ORAL at 07:57

## 2022-02-14 ASSESSMENT — PAIN DESCRIPTION - PROGRESSION: CLINICAL_PROGRESSION: NOT CHANGED

## 2022-02-14 ASSESSMENT — PAIN - FUNCTIONAL ASSESSMENT: PAIN_FUNCTIONAL_ASSESSMENT: ACTIVITIES ARE NOT PREVENTED

## 2022-02-14 ASSESSMENT — PAIN DESCRIPTION - ORIENTATION: ORIENTATION: LOWER;MID

## 2022-02-14 ASSESSMENT — PAIN DESCRIPTION - ONSET: ONSET: ON-GOING

## 2022-02-14 ASSESSMENT — PAIN SCALES - GENERAL
PAINLEVEL_OUTOF10: 0
PAINLEVEL_OUTOF10: 0
PAINLEVEL_OUTOF10: 5

## 2022-02-14 ASSESSMENT — PAIN DESCRIPTION - PAIN TYPE: TYPE: CHRONIC PAIN

## 2022-02-14 ASSESSMENT — PAIN DESCRIPTION - DESCRIPTORS: DESCRIPTORS: ACHING

## 2022-02-14 ASSESSMENT — PAIN DESCRIPTION - LOCATION: LOCATION: BACK

## 2022-02-14 ASSESSMENT — PAIN DESCRIPTION - FREQUENCY: FREQUENCY: INTERMITTENT

## 2022-02-14 NOTE — DISCHARGE SUMMARY
Hospitalist Discharge Summary        Patient: Miya Jones  YOB: 1933  MRN: [id number]   Acct: [de-identified]    Primary Care Physician: Renetta Lemos MD    Admit date  2/11/2022    Discharge date:  2/14/2022 2:45 PM    Chief Complaint on presentation :-    Weakness    Discharge Assessment and Plan:-   1. Urinary tract infection  a. Recurrent issue for the patient. b. Alayne Fairy with e. Coli  c. Treated with empiric Rocephin therapy. Transition to 5 days of Macrobid at discharge. 2. Ground-level fall at home  a. Work-up negative for etiology. Suspect mechanical ground-level fall.  b. CT angiogram of the head neck shows no significant stenosis. c. Evaluated by physical therapy. Recommended placement to ECF versus inpatient rehab. Patient declined. Will prescribe home health and home PT/OT. Chronic conditions:  3. Hypertension  4. History of CVA  5. CAD  6. Thyroid nodules, recommend outpatient thyroid ultrasound if not been completed prior. Initial H and P and Hospital course:-  Patient presented to hospital from home with complaints of weakness and frequent falls. She has a history of recurrent urinary tract infections and was having symptoms consistent with urinary tract infection. Urine culture grew E. coli. She was transitioned from empiric Rocephin therapy to Macrobid at discharge. She will complete additional 5 days of Macrobid therapy. Was evaluated by physical therapy. Recommendation was for placement to a nursing facility or inpatient rehab. The patient and her family declined. They instead opted for home health with home PT/OT.   We will oblige their request.    Physical Exam:-  Vitals:   Patient Vitals for the past 24 hrs:   BP Temp Temp src Pulse Resp SpO2   02/14/22 1224 (!) 121/58 96.8 °F (36 °C) Oral 70 18 --   02/14/22 0753 (!) 146/70 97.6 °F (36.4 °C) Oral 68 16 --   02/14/22 0341 (!) 143/64 97.7 °F (36.5 °C) Oral 69 14 95 %   02/13/22 2301 (!) 141/59 98.5 °F drug: magnesium     D-MANNOSE PO     fish oil 1000 MG Caps     Ginkgo Biloba 120 MG Tabs     NONFORMULARY     NONFORMULARY     NONFORMULARY     sertraline 50 MG tablet  Commonly known as: ZOLOFT     therapeutic multivitamin-minerals tablet     Turmeric Curcumin 5-1000 MG Caps     Vitamin D3 125 MCG (5000 UT) Tabs           Where to Get Your Medications      These medications were sent to [facility]pharm[id number] [phone]  [address], [address] [facility] [address]    Phone: [phone]   · nitrofurantoin (macrocrystal-monohydrate) 100 MG capsule          Labs :-  Recent Results (from the past 72 hour(s))   POCT Glucose    Collection Time: 02/11/22  9:15 PM   Result Value Ref Range    POC Glucose 150 (H) 70 - 108 mg/dl   EKG Emergency    Collection Time: 02/11/22  9:15 PM   Result Value Ref Range    Ventricular Rate 88 BPM    Atrial Rate 88 BPM    P-R Interval 150 ms    QRS Duration 130 ms    Q-T Interval 402 ms    QTc Calculation (Bazett) 486 ms    P Axis 62 degrees    R Axis -56 degrees    T Axis 7 degrees   POCT glucose    Collection Time: 02/11/22  9:29 PM   Result Value Ref Range    Glucose 150 mg/dL    QC OK?  yes    CBC Auto Differential    Collection Time: 02/11/22  9:30 PM   Result Value Ref Range    WBC 24.8 (H) 4.8 - 10.8 thou/mm3    RBC 5.73 (H) 4.20 - 5.40 mill/mm3    Hemoglobin 17.2 (H) 12.0 - 16.0 gm/dl    Hematocrit 53.4 (H) 37.0 - 47.0 %    MCV 93.2 81.0 - 99.0 fL    MCH 30.0 26.0 - 33.0 pg    MCHC 32.2 32.2 - 35.5 gm/dl    RDW-CV 14.8 (H) 11.5 - 14.5 %    RDW-SD 49.5 (H) 35.0 - 45.0 fL    Platelets 002 (H) 193 - 400 thou/mm3    MPV 12.0 9.4 - 12.4 fL    Seg Neutrophils 89.5 %    Lymphocytes 3.7 %    Monocytes 5.6 %    Eosinophils 0.0 %    Basophils 0.4 %    Immature Granulocytes 0.8 %    Platelet Estimate INCREASED Adequate    Segs Absolute 22.2 (H) 1.8 - 7.7 thou/mm3    Lymphocytes Absolute 0.9 (L) 1.0 - 4.8 thou/mm3    Monocytes Absolute 1.4 (H) 0.4 - 1.3 thou/mm3    Eosinophils Absolute 0.0 0.0 - 0.4 thou/mm3    Basophils Absolute 0.1 0.0 - 0.1 thou/mm3    Immature Grans (Abs) 0.20 (H) 0.00 - 0.07 thou/mm3    nRBC 0 /100 wbc   Comprehensive Metabolic Panel w/ Reflex to MG    Collection Time: 02/11/22  9:30 PM   Result Value Ref Range    Glucose 139 (H) 70 - 108 mg/dL    CREATININE 0.8 0.4 - 1.2 mg/dL    BUN 19 7 - 22 mg/dL    Sodium 143 135 - 145 meq/L    Potassium reflex Magnesium 4.3 3.5 - 5.2 meq/L    Chloride 103 98 - 111 meq/L    CO2 23 23 - 33 meq/L    Calcium 9.8 8.5 - 10.5 mg/dL    AST 39 5 - 40 U/L    Alkaline Phosphatase 119 38 - 126 U/L    Total Protein 8.0 6.1 - 8.0 g/dL    Albumin 4.7 3.5 - 5.1 g/dL    Total Bilirubin 0.7 0.3 - 1.2 mg/dL    ALT 26 11 - 66 U/L   Troponin    Collection Time: 02/11/22  9:30 PM   Result Value Ref Range    Troponin T < 0.010 ng/ml   Brain Natriuretic Peptide    Collection Time: 02/11/22  9:30 PM   Result Value Ref Range    Pro-.1 0.0 - 1800.0 pg/mL   Anion Gap    Collection Time: 02/11/22  9:30 PM   Result Value Ref Range    Anion Gap 17.0 (H) 8.0 - 16.0 meq/L   Glomerular Filtration Rate, Estimated    Collection Time: 02/11/22  9:30 PM   Result Value Ref Range    Est, Glom Filt Rate 68 (A) ml/min/1.73m2   Osmolality    Collection Time: 02/11/22  9:30 PM   Result Value Ref Range    Osmolality Calc 289.5 275.0 - 300.0 mOsmol/kg   Scan of Blood Smear    Collection Time: 02/11/22  9:30 PM   Result Value Ref Range    SCAN OF BLOOD SMEAR see below    Urine with Reflexed Micro    Collection Time: 02/11/22  9:50 PM   Result Value Ref Range    Glucose, Ur NEGATIVE NEGATIVE mg/dl    Bilirubin Urine NEGATIVE NEGATIVE    Ketones, Urine TRACE (A) NEGATIVE    Specific Gravity, Urine 1.017 1.002 - 1.030    Blood, Urine NEGATIVE NEGATIVE    pH, UA 5.5 5.0 - 9.0    Protein, UA 30 (A) NEGATIVE    Urobilinogen, Urine 1.0 0.0 - 1.0 eu/dl    Nitrite, Urine POSITIVE (A) NEGATIVE    Leukocyte Esterase, Urine SMALL (A) NEGATIVE    Color, UA DK YELLOW (A) STRAW-YELLOW    Character, Urine CLOUDY (A) CLEAR-SL CLOUD    RBC, UA NONE 0-2/hpf /hpf    WBC, UA 10-15 0-4/hpf /hpf    Epithelial Cells, UA 3-5 3-5/hpf /hpf    Bacteria, UA MODERATE FEW/NONE SEEN /hpf    Casts UA NONE SEEN NONE SEEN /lpf    Crystals, UA NONE SEEN NONE SEEN   Culture, Reflexed, Urine    Collection Time: 02/11/22  9:50 PM    Specimen: Urine   Result Value Ref Range    Organism Escherichia coli (A)     Urine Culture Reflex Hamer count: >100,000 CFU/mL         Susceptibility    Escherichia coli - BACTERIAL SUSCEPTIBILITY PANEL BY SCARLETT     cefOXitin <=4 Sensitive mcg/mL     cefTRIAXone <=1 Sensitive mcg/mL     ampicillin <=2 Sensitive mcg/mL     gentamicin <=1 Sensitive mcg/mL     trimethoprim-sulfamethoxazole <=20 Sensitive mcg/mL     tetracycline <=1 Sensitive mcg/mL     nitrofurantoin <=16 Sensitive mcg/mL   COVID-19, Rapid    Collection Time: 02/11/22 10:00 PM    Specimen: Nasopharyngeal Swab   Result Value Ref Range    SARS-CoV-2, NAAT NOT  DETECTED NOT DETECTED   Culture, Blood 1    Collection Time: 02/11/22 10:09 PM    Specimen: Blood   Result Value Ref Range    Blood Culture, Routine No growth-preliminary     Lactate, Sepsis    Collection Time: 02/11/22 10:09 PM   Result Value Ref Range    Lactic Acid, Sepsis 1.7 0.5 - 1.9 mmol/L   Culture, Blood 2    Collection Time: 02/11/22 10:16 PM    Specimen: Blood   Result Value Ref Range    Blood Culture, Routine No growth-preliminary     Lactate, Sepsis    Collection Time: 02/12/22 12:04 AM   Result Value Ref Range    Lactic Acid, Sepsis 1.0 0.5 - 1.9 mmol/L   Comprehensive Metabolic Panel w/ Reflex to MG    Collection Time: 02/13/22  3:54 AM   Result Value Ref Range    Glucose 97 70 - 108 mg/dL    CREATININE 0.6 0.4 - 1.2 mg/dL    BUN 19 7 - 22 mg/dL    Sodium 137 135 - 145 meq/L    Potassium reflex Magnesium 4.2 3.5 - 5.2 meq/L    Chloride 107 98 - 111 meq/L    CO2 20 (L) 23 - 33 meq/L    Calcium 8.5 8.5 - 10.5 mg/dL    AST 54 (H) 5 - 40 U/L    Alkaline Phosphatase 83 38 - 126 U/L    Total Protein 6.0 (L) 6.1 - 8.0 g/dL    Albumin 3.4 (L) 3.5 - 5.1 g/dL    Total Bilirubin 0.5 0.3 - 1.2 mg/dL    ALT 20 11 - 66 U/L   CBC auto differential    Collection Time: 02/13/22  3:54 AM   Result Value Ref Range    WBC 14.8 (H) 4.8 - 10.8 thou/mm3    RBC 4.82 4.20 - 5.40 mill/mm3    Hemoglobin 14.5 12.0 - 16.0 gm/dl    Hematocrit 47.1 (H) 37.0 - 47.0 %    MCV 97.7 81.0 - 99.0 fL    MCH 30.1 26.0 - 33.0 pg    MCHC 30.8 (L) 32.2 - 35.5 gm/dl    RDW-CV 14.6 (H) 11.5 - 14.5 %    RDW-SD 52.8 (H) 35.0 - 45.0 fL    Platelets 434 (H) 253 - 400 thou/mm3    MPV 11.9 9.4 - 12.4 fL    Seg Neutrophils 76.2 %    Lymphocytes 13.6 %    Monocytes 8.3 %    Eosinophils 0.7 %    Basophils 0.7 %    Immature Granulocytes 0.5 %    Segs Absolute 11.3 (H) 1.8 - 7.7 thou/mm3    Lymphocytes Absolute 2.0 1.0 - 4.8 thou/mm3    Monocytes Absolute 1.2 0.4 - 1.3 thou/mm3    Eosinophils Absolute 0.1 0.0 - 0.4 thou/mm3    Basophils Absolute 0.1 0.0 - 0.1 thou/mm3    Immature Grans (Abs) 0.08 (H) 0.00 - 0.07 thou/mm3    nRBC 0 /100 wbc   Anion Gap    Collection Time: 02/13/22  3:54 AM   Result Value Ref Range    Anion Gap 10.0 8.0 - 16.0 meq/L   Glomerular Filtration Rate, Estimated    Collection Time: 02/13/22  3:54 AM   Result Value Ref Range    Est, Glom Filt Rate >90 ml/min/1.73m2        Microbiology:    Blood culture #1:   Lab Results   Component Value Date    BC No growth-preliminary  02/11/2022       Blood culture #2:No results found for: BLOODCULT2    Organism:  No results found for: LABGRAM    MRSA culture only:No results found for: [facility]    Urine culture:   Lab Results   Component Value Date    LABURIN  08/16/2020     At least one of drugs in current antibiotic therapy may be ineffective in vitro for isolate.      LABURIN  08/16/2020     [place] count: 50,000-90,000 CFU/mL A. urinae is generally susceptible to ampicillin, penicillin, amoxicillin, beta lactams, cephalosporins and doxycycline. This organism has variable susceptibility to levofloxacin,vancomycin and trimethoprim-sulfamethoxazole. Lab Results   Component Value Date    ORG Escherichia coli 02/11/2022        Respiratory culture: No results found for: CULTRESP    Aerobic and Anaerobic :  No results found for: LABAERO  No results found for: LABANAE    Urinalysis:      Lab Results   Component Value Date    NITRU POSITIVE 02/11/2022    WBCUA 10-15 02/11/2022    BACTERIA MODERATE 02/11/2022    RBCUA NONE 02/11/2022    BLOODU NEGATIVE 02/11/2022    GLUCOSEU NEGATIVE 02/11/2022       Radiology:-  XR KNEE RIGHT (MIN 4 VIEWS)    Result Date: 2/11/2022  Right knee, 4 views COMPARISON: None FINDINGS: No acute fracture. No dislocation. No visible effusion. Mild degenerative bony spurring in all compartments. Medial and lateral meniscal chondrocalcinosis. No acute findings. Nonemergent/incidental findings in the report. This document has been electronically signed by: Shakira Lorenzo MD on 02/11/2022 10:48 PM    XR TIBIA FIBULA RIGHT (2 VIEWS)    Result Date: 2/11/2022  Right tibia/fibula, 2 views, 4 images COMPARISON: None FINDINGS: No acute fracture. No dislocation. Degenerative changes in the right knee. Small plantar calcaneal spur. Mild soft tissue swelling. No acute fracture. Mild soft tissue swelling. Nonemergent/incidental findings in the report. This document has been electronically signed by: Shakira Lorenzo MD on 02/11/2022 10:51 PM    XR ANKLE LEFT (MIN 3 VIEWS)    Result Date: 2/11/2022  Left ankle, 3 views COMPARISON: None FINDINGS: No acute fracture. No dislocation. Osteopenia. Small plantar calcaneal spur. Diffuse soft tissue swelling. No acute fracture. Soft tissue swelling. Nonemergent/incidental findings in the report.  This document has been electronically signed by: Shakira Lorenzo MD on 02/11/2022 10:44 PM    XR ANKLE RIGHT (MIN 3 VIEWS)    Result Date: 2/11/2022  Right ankle, 3 views COMPARISON: None FINDINGS: No acute fracture. No dislocation. Osteopenia. Small plantar calcaneal spur. Mild diffuse soft tissue swelling. No acute fracture. Soft tissue swelling. Nonemergent/incidental findings in the report. This document has been electronically signed by: Carol Thompson MD on 02/11/2022 10:46 PM    CT Head WO Contrast    Result Date: 2/11/2022  CT Head WO Contrast COMPARISON: None FINDINGS: No acute intracranial hemorrhage. No evidence of acute infarction. Diffuse cortical volume loss. Nonspecific white matter hypodensities most commonly associated with chronic microangiopathic changes. No mass-effect or midline shift. No hydrocephalus. Visualized orbits are normal. Clear paranasal sinuses. Clear mastoid air cells. No acute fracture. Unremarkable soft tissues. No acute intracranial findings. Nonemergent/incidental findings in the report. This document has been electronically signed by: Carol Thompson MD on 02/11/2022 11:00 PM All CTs at this facility use dose modulation techniques and iterative reconstructions, and/or weight-based dosing when appropriate to reduce radiation to a low as reasonably achievable. XR CHEST PORTABLE    Result Date: 2/11/2022  Chest X-ray, 1 View COMPARISON: None FINDINGS: No consolidation. Mild bibasilar atelectasis. No pleural effusion. No pneumothorax. Mild cardiomegaly. No acute fracture. Orthopedic hardware in the left humerus. No acute findings. Nonemergent/incidental findings in the report. This document has been electronically signed by: Carol Thompson MD on 02/11/2022 10:53 PM    VL DUP CAROTID BILATERAL    Result Date: 2/12/2022  PROCEDURE: VL DUP CAROTID BILATERAL CLINICAL INFORMATION: possible CVA COMPARISON: None TECHNIQUE: Grayscale and color Doppler sonographic imaging of the extracranial carotid arteries performed in longitudinal and transverse planes.  FINDINGS: The peak systolic velocity in the right and left distal common carotid arteries are [] Urology  [] ENT   [] G SURGERY   []Ortho    []CV surg    [] Palliative  [] Hospice [] Pain management   []    []TCU   [] PT/OT  OTHERS:-    Disposition: home  Condition at Discharge: Stable    Time Spent:- 40 minutes    Electronically signed by Uziel Garcia DO on 2/14/22 at 2:46 PM EST   Discharging Hospitalist

## 2022-02-14 NOTE — PROGRESS NOTES
Discharge instructions given to patient and  verbalized understanding. Stable and ambulatory. waiting for transport

## 2022-02-14 NOTE — CARE COORDINATION
2/14/22, 2:51 PM EST    DISCHARGE PLANNING EVALUATION    Spoke with patient and spouse at the bedside as requested by RN. They reside in a one story home with a step inside. She stated that she using a rolling walker to get around and plans to return home with [facility]. They declined ECF for rehab. JUVENAL Armando set up [facility].
given[de-identified] 02/12/22  IMM Letter time given[de-identified] [id number]

## 2022-02-14 NOTE — PROGRESS NOTES
Jesus Daniel 60  INPATIENT OCCUPATIONAL THERAPY  [facility] RENAL TELEMETRY 6K  EVALUATION    Time:    Time In: 3905  Time Out: 1510  Timed Code Treatment Minutes: 12 Minutes  Minutes: 24          Date: 2022  Patient Name: Melissa De Santiago,   Gender: female      MRN: [id number]  : 1933  (80 y.o.)  Referring Practitioner: Dr. Krystal Mendoza. Antalis  Diagnosis: urinary tract infection  Additional Pertinent Hx: The patient is a 80 y.o. female with significant past medical history of HTN, CVA and CAD is admitted for generalized weakness due to UTI. She is a poor historian. She has generalized weakness that started today. She also fell twice due to weakness. The second fall occurred when she tripped for having no shoes. No LOC. No other complaint. Restrictions/Precautions:  Restrictions/Precautions: General Precautions,Fall Risk    Subjective  Chart Reviewed: Yaya Carlni and Physical  Patient assessed for rehabilitation services?: Yes  Family / Caregiver Present: Yes    Subjective: cooperative, fair balance and safety.  spouse present and supportive    Pain:  Pain Assessment  Patient Currently in Pain: Denies    Vitals: Vitals not assessed per clinical judgement, see nursing flowsheet    Social/Functional History:  Lives With: Spouse  Type of Home: House  Home Layout: One level (pt goes to basement)  Home Equipment: 4 wheeled walker (used rollator PTA)   Bathroom Shower/Tub: Walk-in shower,Shower chair with back  Bathroom Toilet: Handicap height  Bathroom Accessibility: Accessible    Receives Help From: Family  ADL Assistance: Independent  Homemaking Assistance: Needs assistance  Ambulation Assistance: Independent  Transfer Assistance: Independent    Active : No     Additional Comments: Spouse kristals, pt assists with laundry    VISION:WFL    HEARING:  Corrected    COGNITION: Decreased Recall, Decreased Problem Solving and Decreased Safety Awareness    RANGE OF MOTION:  Bilateral Upper Extremity: WFL    STRENGTH:  Bilateral Upper Extremity:  Impaired - deconditioned throughout B UEs. SENSATION:   WFL    ADL:   Feeding: Independent. Grooming: Stand By Assistance. standing sinkside x 3 min  Lower Extremity Dressing: Stand By Assistance. Toileting: Stand By Assistance. Toilet Transfer: Stand By Assistance. Meryle Sandman BALANCE:  Sitting Balance:  Independent. Standing Balance: Stand By Assistance. occasionally 1 UE support required. Static standing. BED MOBILITY:  Not Tested    TRANSFERS:  Sit to Stand:  Stand By Assistance. Stand to Sit: Contact Guard Assistance. FUNCTIONAL MOBILITY:  Assistive Device: Rolling Walker  Assist Level:  Stand By Assistance. Distance: To and from bathroom  CGA when ambulating in the wells x 50 feet, min shuffling gait pattern, fwd flexed pattern. Fair awareness with min cues for keeping the RW closer to her and increased upright posture. Exercise:  reviewed HEP AROM  B sh flexion, horiz ABD, ABD with pt completing 5 reps each . Activity Tolerance:  Patient tolerance of  treatment: good. Assessment:  Assessment: Pt presents with a decline in independent functioning and fair balance which increases her risk of falling. She currently requires SBA For sit to stand, CGA to SBA for ambulation to/ from the BR. PT with shuffling gait pattern. She would beneetift from additional skilled OT services to address increasing indepdence and balance for ADLs and functioanl mobility to return home as independently as possible to reduce incidence of falls and caregiver burdon. Performance deficits / Impairments: Decreased functional mobility ,Decreased endurance,Decreased ADL status,Decreased safe awareness,Decreased high-level IADLs  Prognosis: Fair  REQUIRES OT FOLLOW UP: Yes  Decision Making: Medium Complexity  Safety Devices in place: Yes  Type of devices:  All fall risk precautions in place,Left in chair,Call light within reach,Chair alarm in place,Gait belt    Treatment Initiated: Treatment and education initiated within context of evaluation. Evaluation time included review of current medical information, gathering information related to past medical, social and functional history, completion of standardized testing, formal and informal observation of tasks, assessment of data and development of plan of care and goals. Treatment time included skilled education and facilitation of tasks to increase safety and independence with ADL's for improved functional independence and quality of life. Discharge Recommendations:  Continue to assess pending progress,Home [address]    Patient Education:  OT Education: OT Role,Plan of Care,Precautions,Home Exercise Program    Equipment Recommendations:  Equipment Needed: No    Plan:  Times per week: 3-5  Current Treatment Recommendations: Strengthening,Patient/Caregiver Education & Training,Balance Training,Functional Mobility Training,Endurance Training,Safety Education & Training,Self-Care / ADL. See long-term goal time frame for expected duration of plan of care. If no long-term goals established, a short length of stay is anticipated. Goals:  Patient goals : I want to go home soon  Short term goals  Time Frame for Short term goals: by discharge pt nichole  Short term goal 1: complete ADL routine with S and no > min cues for safe tech or attention to task  Short term goal 2: tolerate standing x 6 min with S to increase endurance for ADL tasks  Short term goal 3: ambulate to/ from the BR as well as around obstacles with S and no cues for RW safety  Short term goal 4: complete B UE AROM ex x12 reps without rest breaks to increase strength for homemaking tasks  Long term goals  Time Frame for Long term goals : not set due to est short LOS         Following session, patient left in safe position with all fall risk precautions in place.

## 2022-02-14 NOTE — PROGRESS NOTES
[address] [address]  INPATIENT PHYSICAL THERAPY  EVALUATION  [facility] RENAL TELEMETRY 6K - 6K-15/015-A    Time In: 5078  Time Out: 1249  Timed Code Treatment Minutes: 15 Minutes  Minutes: 29          Date: 2022  Patient Name: Sharif Phillip,  Gender:  female        MRN: [id number]  : 1933  (80 y.o.)      Referring Practitioner: Dr. Mitchell Toribio  Diagnosis: UTI  Additional Pertinent Hx: admit with above diagnosis, ground level fall, HTN, hx prior CVA, CAD, thyroid nodules     Restrictions/Precautions:  Restrictions/Precautions: General Precautions,Fall Risk    Subjective:  Chart Reviewed: Yes  Patient assessed for rehabilitation services?: Yes  Subjective: cooperative, pleasant, anxious to go home, spouse stating he doesn't feel pt is ready to go home yet and needs a day or two    General:    Hearing: Exceptions to [facility]  Hearing Exceptions: Hard of hearing/hearing concerns         Pain: no pain per pt    Vitals: Vitals not assessed per clinical judgement, see nursing flowsheet    Social/Functional History:    Lives With: Spouse  Type of Home: House  Home Layout: One level (pt goes to basement)  Home Equipment: 4 wheeled walker (used rollator PTA)                   Ambulation Assistance: Independent  Transfer Assistance: Independent               OBJECTIVE:  Range of Motion:  Bilateral Lower Extremity: WFL    Strength:  Bilateral Lower Extremity: WFL, generalized weakness    Balance:  Static Sitting Balance:  Modified Independent  Dynamic Sitting Balance: Supervision, for toileting  Static Standing Balance: Stand By Assistance  Dynamic Standing Balance: Stand By Assistance, to CGA wash hands at sink    Bed Mobility:  Rolling to Left: Stand By Assistance   Supine to Sit: Contact Guard Assistance  Scooting: Stand By Assistance    Transfers:  Sit to Stand: Contact Guard Assistance  Stand to Sit:Stand By IMScouting for hand placement for safety  Ambulation:  Contact Guard Assistance  Distance: 60'x1, 10'x1  Surface: Level Tile  Device:Rolling Walker  Gait Deviations: Forward Flexed Posture, Slow Lizz, Decreased Step Length Bilaterally, Decreased Heel Strike Bilaterally, Mild Path Deviations and min unsteady, cues to walk closer to walker for safety        Functional Outcome Measures: Completed  AM-PAC Inpatient Mobility without Stair Climbing Raw Score : 15  AM-PAC Inpatient without Stair Climbing T-Scale Score : 43.03    ASSESSMENT:  Activity Tolerance:  Patient tolerance of  treatment: fair. Treatment Initiated: Treatment and education initiated within context of evaluation. Evaluation time included review of current medical information, gathering information related to past medical, social and functional history, completion of standardized testing, formal and informal observation of tasks, assessment of data and development of plan of care and goals. Treatment time included skilled education and facilitation of tasks to increase safety and independence with functional mobility for improved independence and quality of life. Assessment:   Body structures, Functions, Activity limitations: Decreased functional mobility ,Decreased endurance,Decreased strength,Decreased balance  Assessment: pt tolerated fair, generalized weakness, dec balance, use of walker and inc assist for safe mobility, recommend cont PT to inc pt I with functional mobility  Prognosis: Good    REQUIRES PT FOLLOW UP: Yes    Discharge Recommendations:  Discharge Recommendations: Continue to assess pending progress,Patient would benefit from continued therapy after discharge    Patient Education:  PT Education: [facility],Functional Mobility Training    Equipment Recommendations:  Equipment Needed: No    Plan:  Times per week: 5X GM  Times per day: Daily  Specific instructions for Next Treatment: therex and mobility    Goals:  Patient goals : go home  Short term goals  Time Frame for Short term goals: by discharge  Short term goal 1: bed mobility with MOD I to get in/out of bed  Short term goal 2: transfer with MOD I to get in/out of chairs  Short term goal 3: amb >100'x1 with walker and S to walk safely in home  Short term goal 4: negotiate 2 steps with HR and SBA to enter home safely  Long term goals  Time Frame for Long term goals : no LTGs set secondary to short ELOS    Following session, patient left in safe position with all fall risk precautions in place.

## 2022-02-15 ENCOUNTER — CARE COORDINATION (OUTPATIENT)
Dept: CASE MANAGEMENT | Age: 87
End: 2022-02-15

## 2022-02-15 LAB
ORGANISM: ABNORMAL
ORGANISM: ABNORMAL
URINE CULTURE REFLEX: ABNORMAL
URINE CULTURE REFLEX: ABNORMAL

## 2022-02-15 NOTE — CARE COORDINATION
Care Transitions Outreach Attempt    Call within 2 business days of discharge: Yes   Attempted initial 24 hour transitional call to patient. Left VM to return call directly to [phone]. LVM on  phone also. Patient: Juan Alberto Biswas Patient : 1933 MRN: [id number]    Last Discharge [facility]       Complaint Diagnosis Description Type Department Provider    22 Extremity Weakness Urinary tract infection without hematuria, site unspecified . .. ED to Hosp-Admission (Discharged) (ADMITTED) BERNARD PeralesK Ernesto Davalos DO; Sivan Cedeno [place]... Had spoken to son, Cassidy Butcher earlier today. The phone number for her was incorrect, Cassidy Butcher gave the correct number and I updated it. Said his mom has been sleeping late and to try this afternoon but did not answer. Said he saw her last night and was doing ok. Confirmed HH referral with [facility] and gave them the correct number for [place]. Noted following upcoming appointments from discharge chart review:   [facility] follow up appointment(s): No future appointments.   Non-[facility] follow up appointment(s): na

## 2022-02-16 ENCOUNTER — CARE COORDINATION (OUTPATIENT)
Dept: CASE MANAGEMENT | Age: 87
End: 2022-02-16

## 2022-02-16 NOTE — CARE COORDINATION
Care Transitions Outreach Attempt-2nd attempt    Call within 2 business days of discharge: Yes   Attempted initial 24 hour transitional call to patient. Left VM to return call directly to [phone]. If no return call, CTN will sign off-2nd attempt. Patient: Lynette Gudino Patient : 1933 MRN: [id number]    Last Discharge [facility]       Complaint Diagnosis Description Type Department Provider    22 Extremity Weakness Urinary tract infection without hematuria, site unspecified . .. ED to Hosp-Admission (Discharged) (ADMITTED) BERNARD Charlton DO; [place], [place]... Noted following upcoming appointments from discharge chart review:   [facility] follow up appointment(s): No future appointments.   Non-[facility] follow up appointment(s): na

## 2022-02-17 LAB
BLOOD CULTURE, ROUTINE: NORMAL
BLOOD CULTURE, ROUTINE: NORMAL

## 2022-03-14 PROBLEM — N39.0 UTI (URINARY TRACT INFECTION): Status: RESOLVED | Noted: 2022-02-12 | Resolved: 2022-03-14

## 2022-05-14 DIAGNOSIS — S82.891D CLOSED FRACTURE OF RIGHT ANKLE WITH ROUTINE HEALING, SUBSEQUENT ENCOUNTER: Primary | ICD-10-CM

## 2022-05-14 RX ORDER — HYDROCODONE BITARTRATE AND ACETAMINOPHEN 5; 325 MG/1; MG/1
1 TABLET ORAL EVERY 6 HOURS PRN
Qty: 28 TABLET | Refills: 0 | Status: SHIPPED | OUTPATIENT
Start: 2022-05-14 | End: 2022-05-21

## 2022-12-08 ENCOUNTER — OUTSIDE SERVICES (OUTPATIENT)
Dept: FAMILY MEDICINE CLINIC | Age: 87
End: 2022-12-08

## 2022-12-08 VITALS
SYSTOLIC BLOOD PRESSURE: 117 MMHG | TEMPERATURE: 97.6 F | BODY MASS INDEX: 29.73 KG/M2 | HEART RATE: 82 BPM | DIASTOLIC BLOOD PRESSURE: 68 MMHG | OXYGEN SATURATION: 95 % | RESPIRATION RATE: 18 BRPM | WEIGHT: 184.2 LBS

## 2022-12-08 DIAGNOSIS — I10 ESSENTIAL HYPERTENSION: Primary | ICD-10-CM

## 2022-12-08 DIAGNOSIS — L97.909 ARTERIAL LEG ULCER (HCC): ICD-10-CM

## 2022-12-08 DIAGNOSIS — F41.1 GAD (GENERALIZED ANXIETY DISORDER): ICD-10-CM

## 2022-12-08 ASSESSMENT — ENCOUNTER SYMPTOMS
VOMITING: 0
EYE REDNESS: 0
CONSTIPATION: 0
WHEEZING: 0
RHINORRHEA: 0
SORE THROAT: 0
COUGH: 0
SHORTNESS OF BREATH: 0
NAUSEA: 0
DIARRHEA: 0

## 2022-12-08 NOTE — PROGRESS NOTES
Tamika Greene is a 80 y.o. female who presents today for her medical conditions/complaints as noted below. Chief Complaint   Patient presents with    1 Month Follow-Up     Federally mandated 30 day visit           HPI:   Is seen and examined today for follow-up on her chronic health conditions. She is seen in her room and denies any complaints. Patient has chronic health conditions of hypertension, anxiety, arterial leg ulcer, history of right ankle fracture, leukocytosis. She continues to follow-up with the wound clinic and she states that they are very pleased with the way it is improving. She has had no recent falls and her blood pressure is well controlled. We will continue to monitor blood counts per hematology. Past Medical History:   Diagnosis Date    Blood circulation, collateral     lower legs    Cataract     Hypertension     Unspecified cerebral artery occlusion with cerebral infarction      UTI (lower urinary tract infection)       Past Surgical History:   Procedure Laterality Date    BREAST BIOPSY      EYE SURGERY      bilateral cataracts    OTHER SURGICAL HISTORY  2015    ORIF LEFT PROXIMAL HUMERUS FRACTURE       Family History   Problem Relation Age of Onset    Stroke Father        Social History     Tobacco Use    Smoking status: Former     Years: 2.00     Types: Cigarettes     Quit date: 1/15/2017     Years since quittin.8    Smokeless tobacco: Never   Substance Use Topics    Alcohol use:  Yes     Alcohol/week: 1.0 standard drink     Types: 1 Glasses of wine per week      No Known Allergies    Health Maintenance   Topic Date Due    Depression Screen  Never done    Shingles vaccine (1 of 2) Never done    DTaP/Tdap/Td vaccine (1 - Tdap) 1995    Pneumococcal 65+ years Vaccine (1 - PCV) Never done    Annual Wellness Visit (AWV)  Never done    Flu vaccine (1) Never done    COVID-19 Vaccine  Completed    Hepatitis A vaccine  Aged Out    Hib vaccine  Aged Out Meningococcal (ACWY) vaccine  Aged Out       Subjective:      Review of Systems   Constitutional:  Negative for chills, fatigue and fever. HENT:  Negative for congestion, rhinorrhea and sore throat. Eyes:  Negative for redness and visual disturbance. Respiratory:  Negative for cough, shortness of breath and wheezing. Cardiovascular:  Negative for chest pain and leg swelling. Gastrointestinal:  Negative for constipation, diarrhea, nausea and vomiting. Endocrine: Negative for polydipsia and polyuria. Genitourinary:  Negative for dysuria, frequency and hematuria. Musculoskeletal:  Positive for arthralgias and gait problem. Negative for myalgias. Skin:  Positive for wound. Negative for rash. Allergic/Immunologic: Negative for environmental allergies and immunocompromised state. Neurological:  Negative for dizziness, light-headedness and headaches. Hematological:  Does not bruise/bleed easily. Psychiatric/Behavioral:  Negative for dysphoric mood and sleep disturbance. The patient is not nervous/anxious. Objective:     Physical Exam  Vitals and nursing note reviewed. Constitutional:       General: She is not in acute distress. Appearance: She is well-developed. HENT:      Head: Normocephalic and atraumatic. Right Ear: External ear normal.      Left Ear: External ear normal.      Nose: Nose normal.   Eyes:      General: No scleral icterus. Right eye: No discharge. Left eye: No discharge. Conjunctiva/sclera: Conjunctivae normal.   Neck:      Thyroid: No thyromegaly. Vascular: No JVD. Cardiovascular:      Rate and Rhythm: Normal rate and regular rhythm. Heart sounds: Normal heart sounds. Pulmonary:      Effort: Pulmonary effort is normal. No respiratory distress. Breath sounds: Normal breath sounds. No stridor. No wheezing or rales. Abdominal:      General: Bowel sounds are normal. There is no distension. Palpations: Abdomen is soft. Tenderness: There is no abdominal tenderness. Musculoskeletal:         General: No deformity. Normal range of motion. Right shoulder: No tenderness or bony tenderness. Left shoulder: No tenderness or bony tenderness. Cervical back: Neck supple. No tenderness or bony tenderness. Thoracic back: No spasms, tenderness or bony tenderness. Lumbar back: No tenderness or bony tenderness. Right lower leg: Edema present. Left lower leg: Edema present. Lymphadenopathy:      Cervical: No cervical adenopathy. Upper Body:      Right upper body: No supraclavicular adenopathy. Left upper body: No supraclavicular adenopathy. Skin:     General: Skin is warm and dry. Findings: Wound (Right foot/ankle) present. No erythema or rash. Neurological:      Mental Status: She is alert and oriented to person, place, and time. Motor: No atrophy, abnormal muscle tone or seizure activity. Psychiatric:         Speech: Speech normal.         Behavior: Behavior normal.     /68   Pulse 82   Temp 97.6 °F (36.4 °C)   Resp 18   Wt 184 lb 3.2 oz (83.6 kg)   SpO2 95%   BMI 29.73 kg/m²     Assessment/Plan      1. Essential hypertension    2. SONJA (generalized anxiety disorder)    3. Arterial leg ulcer ([address]      1. Blood pressures controlled on current dose of amlodipine. Continue to monitor. 2.  Mood overall stable on current dose of Zoloft. Continue to monitor. 3.  Chronic and continue to follow-up with wound clinic. States wound is improving overall. We will continue support and monitor. Patient seen and examined. History partially obtained by chart review and nursing notes. I have reviewed patient's past medical, surgical, social, and family history and have made updates where appropriate. See facility EMR for updated medication list.      More than 50% of the total visit time must involve counseling/coordination of care.  The total visit time encompasses both the face-to-face time spent with the patient at the bedside and the additional time spent on the unit/floor reviewing data, obtaining relevant patient information, and discussing the case with other involved healthcare providers.          Electronically signed by LILLIAM Garza CNP on 12/8/2022 at 5:19 PM

## 2023-01-12 ENCOUNTER — OUTSIDE SERVICES (OUTPATIENT)
Dept: FAMILY MEDICINE CLINIC | Age: 88
End: 2023-01-12

## 2023-01-12 VITALS
HEART RATE: 66 BPM | SYSTOLIC BLOOD PRESSURE: 124 MMHG | BODY MASS INDEX: 29.7 KG/M2 | OXYGEN SATURATION: 94 % | WEIGHT: 184 LBS | TEMPERATURE: 97.6 F | RESPIRATION RATE: 20 BRPM | DIASTOLIC BLOOD PRESSURE: 65 MMHG

## 2023-01-12 DIAGNOSIS — L97.909 ARTERIAL LEG ULCER (HCC): ICD-10-CM

## 2023-01-12 DIAGNOSIS — I10 ESSENTIAL HYPERTENSION: Primary | ICD-10-CM

## 2023-01-12 DIAGNOSIS — D72.829 LEUKOCYTOSIS, UNSPECIFIED TYPE: ICD-10-CM

## 2023-01-12 DIAGNOSIS — F41.1 GAD (GENERALIZED ANXIETY DISORDER): ICD-10-CM

## 2023-01-12 ASSESSMENT — ENCOUNTER SYMPTOMS
VOMITING: 0
DIARRHEA: 0
CONSTIPATION: 0
NAUSEA: 0
SORE THROAT: 0
COUGH: 0
RHINORRHEA: 0
EYE REDNESS: 0
WHEEZING: 0
SHORTNESS OF BREATH: 0

## 2023-01-12 NOTE — PROGRESS NOTES
Mau Nagy is a 80 y.o. female who presents today for her medical conditions/complaints as noted below. Chief Complaint   Patient presents with    1 Month Follow-Up     Federally mandated 30 day visit           HPI:     Patient is seen and examined today for follow up on chronic health conditions. Patient is up sitting in her room. She continues to be followed by wound clinic and stated she got a good report. She does not have to return for another 3 weeks. She has chronic health conditions of HTN, anxiety, arterial leg ulcer, hx of ankle fx and leukocytosis. Past Medical History:   Diagnosis Date    Blood circulation, collateral     lower legs    Cataract     Hypertension     Unspecified cerebral artery occlusion with cerebral infarction -     UTI (lower urinary tract infection)       Past Surgical History:   Procedure Laterality Date    BREAST BIOPSY      EYE SURGERY      bilateral cataracts    OTHER SURGICAL HISTORY  2015    ORIF LEFT PROXIMAL HUMERUS FRACTURE       Family History   Problem Relation Age of Onset    Stroke Father        Social History     Tobacco Use    Smoking status: Former     Years: 2.00     Types: Cigarettes     Quit date: 1/15/2017     Years since quittin.9    Smokeless tobacco: Never   Substance Use Topics    Alcohol use: Yes     Alcohol/week: 1.0 standard drink     Types: 1 Glasses of wine per week      No Known Allergies    Health Maintenance   Topic Date Due    Depression Screen  Never done    Shingles vaccine (1 of 2) Never done    DTaP/Tdap/Td vaccine (1 - Tdap) 1995    Pneumococcal 65+ years Vaccine (1 - PCV) Never done    Flu vaccine (1) Never done    Annual Wellness Visit (AWV)  2022    COVID-19 Vaccine  Completed    Hepatitis A vaccine  Aged Out    Hib vaccine  Aged Out    Meningococcal (ACWY) vaccine  Aged Out       Subjective:      Review of Systems   Constitutional:  Negative for chills, fatigue and fever.    HENT:  Negative for congestion, rhinorrhea and sore throat. Eyes:  Negative for redness and visual disturbance. Respiratory:  Negative for cough, shortness of breath and wheezing. Cardiovascular:  Negative for chest pain and leg swelling. Gastrointestinal:  Negative for constipation, diarrhea, nausea and vomiting. Endocrine: Negative for polydipsia and polyuria. Genitourinary:  Negative for dysuria, frequency and hematuria. Musculoskeletal:  Positive for arthralgias. Negative for gait problem and myalgias. Skin:  Positive for wound. Negative for rash. Allergic/Immunologic: Negative for environmental allergies and immunocompromised state. Neurological:  Positive for weakness. Negative for dizziness, light-headedness and headaches. Hematological:  Does not bruise/bleed easily. Psychiatric/Behavioral:  Negative for dysphoric mood and sleep disturbance. The patient is not nervous/anxious. Objective:     Physical Exam  Vitals and nursing note reviewed. Constitutional:       General: She is not in acute distress. Appearance: She is well-developed. HENT:      Head: Normocephalic and atraumatic. Right Ear: External ear normal.      Left Ear: External ear normal.      Nose: Nose normal.   Eyes:      General: No scleral icterus. Right eye: No discharge. Left eye: No discharge. Conjunctiva/sclera: Conjunctivae normal.   Neck:      Thyroid: No thyromegaly. Vascular: No JVD. Cardiovascular:      Rate and Rhythm: Normal rate and regular rhythm. Heart sounds: Normal heart sounds. Pulmonary:      Effort: Pulmonary effort is normal. No respiratory distress. Breath sounds: Normal breath sounds. No stridor. No wheezing or rales. Abdominal:      General: Bowel sounds are normal. There is no distension. Palpations: Abdomen is soft. Tenderness: There is no abdominal tenderness. Musculoskeletal:         General: No deformity. Normal range of motion.       Right shoulder: No tenderness or bony tenderness. Left shoulder: No tenderness or bony tenderness. Cervical back: Neck supple. No tenderness or bony tenderness. Thoracic back: No spasms, tenderness or bony tenderness. Lumbar back: No tenderness or bony tenderness. Right hip: Decreased strength. Left hip: Decreased strength. Feet:      Right foot:      Skin integrity: Skin breakdown present. Lymphadenopathy:      Cervical: No cervical adenopathy. Upper Body:      Right upper body: No supraclavicular adenopathy. Left upper body: No supraclavicular adenopathy. Skin:     General: Skin is warm and dry. Findings: No erythema or rash. Neurological:      Mental Status: She is alert and oriented to person, place, and time. Motor: No atrophy, abnormal muscle tone or seizure activity. Psychiatric:         Speech: Speech normal.         Behavior: Behavior normal.     /65   Pulse 66   Temp 97.6 °F (36.4 °C)   Resp 20   Wt 184 lb (83.5 kg)   SpO2 94%   BMI 29.70 kg/m²     Assessment/Plan      1. Essential hypertension    2. Arterial leg ulcer ([address]    3. SONJA (generalized anxiety disorder)    4. Leukocytosis, unspecified type      Chronic continue current dose of amlodipine. Note vitals noted. .  2.  Follow-up with wound clinic. States wound is getting better. 3.  Mood is overall stable on current dose of Zoloft. 4.  Follow-up with hematology as directed. Patient seen and examined. History partially obtained by chart review and nursing notes. I have reviewed patient's past medical, surgical, social, and family history and have made updates where appropriate. See facility EMR for updated medication list.      More than 50% of the total visit time must involve counseling/coordination of care.  The total visit time encompasses both the face-to-face time spent with the patient at the bedside and the additional time spent on the unit/floor reviewing data, obtaining relevant patient information, and discussing the case with other involved healthcare providers.          Electronically signed by LILLIAM Servin CNP on 1/12/2023 at 3:29 PM

## 2023-02-07 ENCOUNTER — OUTSIDE SERVICES (OUTPATIENT)
Dept: FAMILY MEDICINE CLINIC | Age: 88
End: 2023-02-07
Payer: MEDICARE

## 2023-02-07 VITALS
TEMPERATURE: 97.1 F | WEIGHT: 185 LBS | HEART RATE: 66 BPM | SYSTOLIC BLOOD PRESSURE: 114 MMHG | DIASTOLIC BLOOD PRESSURE: 69 MMHG | OXYGEN SATURATION: 92 % | BODY MASS INDEX: 29.86 KG/M2 | RESPIRATION RATE: 18 BRPM

## 2023-02-07 DIAGNOSIS — M79.675 PAIN OF LEFT GREAT TOE: Primary | ICD-10-CM

## 2023-02-07 PROCEDURE — 99308 SBSQ NF CARE LOW MDM 20: CPT | Performed by: NURSE PRACTITIONER

## 2023-02-07 ASSESSMENT — ENCOUNTER SYMPTOMS
RHINORRHEA: 0
SHORTNESS OF BREATH: 0
VOMITING: 0
COUGH: 0
NAUSEA: 0
SORE THROAT: 0
CONSTIPATION: 0
DIARRHEA: 0
WHEEZING: 0

## 2023-02-07 NOTE — PROGRESS NOTES
Kei Maria is a 80 y.o. female who presents today for her medical conditions/complaints as noted below. Chief Complaint   Patient presents with    Other     Toe pain           HPI:     Patient seen and examined today for complaints of left toe pain. She stated it was swollen yesterday but less swelling today. There is no redness but she has tenderness at her second joint upon palpation. Patient has no history of gout per self-report. We will check a uric acid level. Current Outpatient Medications   Medication Sig Dispense Refill    sertraline (ZOLOFT) 50 MG tablet Take 50 mg by mouth daily      Cinnamon 500 MG TABS Take by mouth      BACILLUS COAGULANS-INULIN PO Take by mouth      Ginkgo Biloba 120 MG TABS Take by mouth      Cranberry-Vitamin C 47549-335 MG CAPS Take by mouth      magnesium ([employer] MAGNESIUM) 250 MG TABS tablet Take 250 mg by mouth daily      Cholecalciferol (VITAMIN D3) 5000 units TABS Take by mouth      NONFORMULARY       NONFORMULARY       Black Pepper-Turmeric (TURMERIC CURCUMIN) 5-1000 MG CAPS Take by mouth      NONFORMULARY       D-MANNOSE PO Take by mouth      Omega-3 Fatty Acids (FISH OIL) 1000 MG CAPS Take 3,000 mg by mouth daily Every other day is twice daily      aspirin 81 MG chewable tablet Take 1 tablet by mouth daily. 30 tablet 2    amLODIPine (NORVASC) 2.5 MG tablet Take 1 tablet by mouth 2 times daily. 30 tablet 3    Multiple Vitamins-Minerals (THERAPEUTIC MULTIVITAMIN-MINERALS) tablet Take 1 tablet by mouth daily. No current facility-administered medications for this visit. No Known Allergies    Subjective:      Review of Systems   Constitutional:  Negative for chills and fever. HENT:  Negative for congestion, postnasal drip, rhinorrhea and sore throat. Respiratory:  Negative for cough, shortness of breath and wheezing. Cardiovascular:  Negative for chest pain and leg swelling.    Gastrointestinal:  Negative for constipation, diarrhea, nausea and vomiting. Genitourinary:  Negative for dysuria, frequency and urgency. Musculoskeletal:  Positive for joint swelling. Negative for arthralgias and myalgias. Skin:  Negative for pallor and rash. Objective:     /69   Pulse 66   Temp 97.1 °F (36.2 °C)   Resp 18   Wt 185 lb (83.9 kg)   SpO2 92%   BMI 29.86 kg/m²     Physical Exam  Vitals and nursing note reviewed. Constitutional:       General: She is not in acute distress. Appearance: She is well-developed. HENT:      Head: Normocephalic and atraumatic. Eyes:      General: No scleral icterus. Right eye: No discharge. Left eye: No discharge. Cardiovascular:      Rate and Rhythm: Normal rate and regular rhythm. Pulses:           Dorsalis pedis pulses are 2+ on the right side and 2+ on the left side. Posterior tibial pulses are 2+ on the right side and 2+ on the left side. Pulmonary:      Effort: Pulmonary effort is normal. No respiratory distress. Breath sounds: Normal breath sounds. No wheezing. Abdominal:      General: Bowel sounds are normal. There is no distension. Palpations: Abdomen is soft. Tenderness: There is no abdominal tenderness. Musculoskeletal:         General: No deformity. Left foot: Prominent metatarsal heads present. Feet:      Left foot:      Skin integrity: Erythema present. Skin:     General: Skin is warm and dry. Neurological:      Mental Status: She is alert and oriented to person, place, and time. Assessment/Plan      1. Pain of left great toe      Will check uric acid level and treat as needed. Ice as tolerated. On this date 2/7/2023 I have spent 15 minutes reviewing previous notes, test results and face to face (virtual) with the patient discussing the diagnosis and importance of compliance with the treatment plan as well as documenting on the day of the visit.       Electronically signed by LILLIAM Erwin CNP on 2/7/2023 at 10:33 AM

## 2023-02-09 ENCOUNTER — OUTSIDE SERVICES (OUTPATIENT)
Dept: FAMILY MEDICINE CLINIC | Age: 88
End: 2023-02-09

## 2023-02-09 VITALS
WEIGHT: 185 LBS | DIASTOLIC BLOOD PRESSURE: 69 MMHG | RESPIRATION RATE: 18 BRPM | SYSTOLIC BLOOD PRESSURE: 114 MMHG | TEMPERATURE: 97.1 F | OXYGEN SATURATION: 92 % | HEART RATE: 66 BPM | BODY MASS INDEX: 29.86 KG/M2

## 2023-02-09 DIAGNOSIS — I73.9 PAD (PERIPHERAL ARTERY DISEASE) (HCC): ICD-10-CM

## 2023-02-09 DIAGNOSIS — L97.909 ARTERIAL LEG ULCER (HCC): ICD-10-CM

## 2023-02-09 DIAGNOSIS — D72.829 LEUKOCYTOSIS, UNSPECIFIED TYPE: ICD-10-CM

## 2023-02-09 DIAGNOSIS — I10 ESSENTIAL HYPERTENSION: Primary | ICD-10-CM

## 2023-02-09 DIAGNOSIS — F41.1 GAD (GENERALIZED ANXIETY DISORDER): ICD-10-CM

## 2023-02-09 DIAGNOSIS — M79.89 SWELLING OF LEFT FOOT: ICD-10-CM

## 2023-02-09 ASSESSMENT — ENCOUNTER SYMPTOMS
VOMITING: 0
RHINORRHEA: 0
EYE REDNESS: 0
WHEEZING: 0
SORE THROAT: 0
CONSTIPATION: 0
NAUSEA: 0
SHORTNESS OF BREATH: 0
DIARRHEA: 0
COLOR CHANGE: 1
COUGH: 0

## 2023-02-09 NOTE — PROGRESS NOTES
Anthony Starr is a 80 y.o. female who presents today for her medical conditions/complaints as noted below. Chief Complaint   Patient presents with    1 Month Follow-Up     Federally mandated 30 day visit           HPI:     Patient seen and examined today for follow-up on her chronic health conditions. She is seen in her room and denies any complaints. She did have a uric acid level on 2023 and that was within normal limits. We did this because she was having left great toe pain at the joint. Today her foot is more swollen and I am unable to palpate pulse. This was verified by an RN in the facility. Patient denies any pain except on the outer aspect of her her left great toe. Patient has a history of arterial ulcers in the right leg and currently still has treatment there. We will send patient to the hospital for Doppler of left lower extremity both arterial and venous. Past Medical History:   Diagnosis Date    Blood circulation, collateral     lower legs    Cataract     Hypertension     Unspecified cerebral artery occlusion with cerebral infarction -     UTI (lower urinary tract infection)       Past Surgical History:   Procedure Laterality Date    BREAST BIOPSY      EYE SURGERY      bilateral cataracts    OTHER SURGICAL HISTORY  2015    ORIF LEFT PROXIMAL HUMERUS FRACTURE       Family History   Problem Relation Age of Onset    Stroke Father        Social History     Tobacco Use    Smoking status: Former     Years: 2.00     Types: Cigarettes     Quit date: 1/15/2017     Years since quittin.0    Smokeless tobacco: Never   Substance Use Topics    Alcohol use:  Yes     Alcohol/week: 1.0 standard drink     Types: 1 Glasses of wine per week      No Known Allergies    Health Maintenance   Topic Date Due    Depression Screen  Never done    Shingles vaccine (1 of 2) Never done    DTaP/Tdap/Td vaccine (1 - Tdap) 1995    Pneumococcal 65+ years Vaccine (1 - PCV) Never done    Flu vaccine (1) Never done    Annual Wellness Visit (AWV)  09/03/2022    COVID-19 Vaccine  Completed    Hepatitis A vaccine  Aged Out    Hib vaccine  Aged Out    Meningococcal (ACWY) vaccine  Aged Out       Subjective:      Review of Systems   Constitutional:  Negative for chills, fatigue and fever. HENT:  Negative for congestion, rhinorrhea and sore throat. Eyes:  Negative for redness and visual disturbance. Respiratory:  Negative for cough, shortness of breath and wheezing. Cardiovascular:  Negative for chest pain and leg swelling. Gastrointestinal:  Negative for constipation, diarrhea, nausea and vomiting. Endocrine: Negative for polydipsia and polyuria. Genitourinary:  Negative for dysuria, frequency and hematuria. Musculoskeletal:  Positive for arthralgias and gait problem. Negative for myalgias. Skin:  Positive for color change. Negative for rash and wound. Allergic/Immunologic: Negative for environmental allergies and immunocompromised state. Neurological:  Positive for weakness. Negative for dizziness, light-headedness and headaches. Hematological:  Does not bruise/bleed easily. Psychiatric/Behavioral:  Negative for dysphoric mood and sleep disturbance. The patient is not nervous/anxious. Objective:     Physical Exam  Vitals and nursing note reviewed. Constitutional:       General: She is not in acute distress. Appearance: She is well-developed. She is ill-appearing (chronically). HENT:      Head: Normocephalic and atraumatic. Right Ear: External ear normal.      Left Ear: External ear normal.      Nose: Nose normal.   Eyes:      General: No scleral icterus. Right eye: No discharge. Left eye: No discharge. Conjunctiva/sclera: Conjunctivae normal.   Neck:      Thyroid: No thyromegaly. Vascular: No JVD. Cardiovascular:      Rate and Rhythm: Normal rate and regular rhythm. Pulses:           Dorsalis pedis pulses are 0 on the left side.       Heart sounds: Normal heart sounds. Pulmonary:      Effort: Pulmonary effort is normal. No respiratory distress. Breath sounds: Normal breath sounds. No stridor. No wheezing or rales. Abdominal:      General: Bowel sounds are normal. There is no distension. Palpations: Abdomen is soft. Tenderness: There is no abdominal tenderness. Musculoskeletal:         General: No deformity. Normal range of motion. Right shoulder: No tenderness or bony tenderness. Left shoulder: No tenderness or bony tenderness. Cervical back: Neck supple. No tenderness or bony tenderness. Thoracic back: No spasms, tenderness or bony tenderness. Lumbar back: No tenderness or bony tenderness. Feet:    Feet:      Left foot:      Skin integrity: Erythema and warmth present. Lymphadenopathy:      Cervical: No cervical adenopathy. Upper Body:      Right upper body: No supraclavicular adenopathy. Left upper body: No supraclavicular adenopathy. Skin:     General: Skin is warm and dry. Findings: No erythema or rash. Neurological:      Mental Status: She is alert and oriented to person, place, and time. Motor: No atrophy, abnormal muscle tone or seizure activity. Psychiatric:         Speech: Speech normal.         Behavior: Behavior normal.     /69   Pulse 66   Temp 97.1 °F (36.2 °C)   Resp 18   Wt 185 lb (83.9 kg)   SpO2 92%   BMI 29.86 kg/m²     Assessment/Plan      1. Essential hypertension -sugars remain controlled. Continue current dose of amlodipine. 2. Arterial leg ulcer ([address] -chronic and continue to follow-up with podiatry. Wound is improving overall. Receiving therapy. 3. SONJA (generalized anxiety disorder) -mood overall stable. Continue current dose of Zoloft. Psych is following. 4. Leukocytosis, unspecified type -monitor labs as directed. 5. PAD (peripheral artery disease) ([address] -patient has no pulses in her left foot.   We will send over for a Doppler. History of arterial ulcer on right lower leg. Known history of stenosis. 6. Swelling of left foot -foot looks larger in size and unable to palpate pulse today. We will send over for Doppler. Patient seen and examined. History partially obtained by chart review and nursing notes. I have reviewed patient's past medical, surgical, social, and family history and have made updates where appropriate. See facility EMR for updated medication list.      More than 50% of the total visit time must involve counseling/coordination of care. The total visit time encompasses both the face-to-face time spent with the patient at the bedside and the additional time spent on the unit/floor reviewing data, obtaining relevant patient information, and discussing the case with other involved healthcare providers. On this date 2/8/23 I have spent 34 minutes reviewing previous notes, test results and face to face with the patient discussing the diagnosis and importance of compliance with the treatment plan as well as documenting on the day of the visit. Resident has HTN, PAD, SONJA and it is expected to last 12 or more months. These chronic conditions place resident at a significant risk of death, acute exacerbation, decline in function or functional decline. The comprehensive plan was established, implemented, revised or monitored today and were provided a copy if requested from the facility. I spent 20 minutes reviewing plan. Patient or designee were informed of the role of chronic care management services and gave verbal consent to accept these services.            Electronically signed by LILLIAM Alanis CNP on 2/9/2023 at 1:20 PM

## 2024-01-25 PROBLEM — L97.909 ARTERIAL LEG ULCER (HCC): Status: RESOLVED | Noted: 2023-01-12 | Resolved: 2024-01-25
